# Patient Record
Sex: MALE | Race: OTHER | HISPANIC OR LATINO | URBAN - METROPOLITAN AREA
[De-identification: names, ages, dates, MRNs, and addresses within clinical notes are randomized per-mention and may not be internally consistent; named-entity substitution may affect disease eponyms.]

---

## 2020-03-09 ENCOUNTER — EMERGENCY (EMERGENCY)
Facility: HOSPITAL | Age: 40
LOS: 1 days | Discharge: HOME | End: 2020-03-09
Attending: EMERGENCY MEDICINE | Admitting: EMERGENCY MEDICINE
Payer: SELF-PAY

## 2020-03-09 VITALS
SYSTOLIC BLOOD PRESSURE: 136 MMHG | DIASTOLIC BLOOD PRESSURE: 100 MMHG | HEIGHT: 66 IN | RESPIRATION RATE: 18 BRPM | TEMPERATURE: 98 F | WEIGHT: 160.94 LBS | OXYGEN SATURATION: 99 % | HEART RATE: 90 BPM

## 2020-03-09 VITALS
OXYGEN SATURATION: 95 % | SYSTOLIC BLOOD PRESSURE: 141 MMHG | HEART RATE: 86 BPM | DIASTOLIC BLOOD PRESSURE: 83 MMHG | RESPIRATION RATE: 16 BRPM

## 2020-03-09 DIAGNOSIS — R63.1 POLYDIPSIA: ICD-10-CM

## 2020-03-09 LAB
ANION GAP SERPL CALC-SCNC: 9 MMOL/L — SIGNIFICANT CHANGE UP (ref 5–17)
BASOPHILS # BLD AUTO: 0.03 K/UL — SIGNIFICANT CHANGE UP (ref 0–0.2)
BASOPHILS NFR BLD AUTO: 0.4 % — SIGNIFICANT CHANGE UP (ref 0–2)
BUN SERPL-MCNC: 14 MG/DL — SIGNIFICANT CHANGE UP (ref 7–23)
CALCIUM SERPL-MCNC: 9.5 MG/DL — SIGNIFICANT CHANGE UP (ref 8.4–10.5)
CHLORIDE SERPL-SCNC: 100 MMOL/L — SIGNIFICANT CHANGE UP (ref 96–108)
CO2 SERPL-SCNC: 28 MMOL/L — SIGNIFICANT CHANGE UP (ref 22–31)
CREAT SERPL-MCNC: 0.73 MG/DL — SIGNIFICANT CHANGE UP (ref 0.5–1.3)
EOSINOPHIL # BLD AUTO: 0.25 K/UL — SIGNIFICANT CHANGE UP (ref 0–0.5)
EOSINOPHIL NFR BLD AUTO: 3.6 % — SIGNIFICANT CHANGE UP (ref 0–6)
GLUCOSE SERPL-MCNC: 384 MG/DL — HIGH (ref 70–99)
HCT VFR BLD CALC: 47.2 % — SIGNIFICANT CHANGE UP (ref 39–50)
HGB BLD-MCNC: 16.6 G/DL — SIGNIFICANT CHANGE UP (ref 13–17)
IMM GRANULOCYTES NFR BLD AUTO: 0.7 % — SIGNIFICANT CHANGE UP (ref 0–1.5)
LYMPHOCYTES # BLD AUTO: 2.08 K/UL — SIGNIFICANT CHANGE UP (ref 1–3.3)
LYMPHOCYTES # BLD AUTO: 30.1 % — SIGNIFICANT CHANGE UP (ref 13–44)
MCHC RBC-ENTMCNC: 29.9 PG — SIGNIFICANT CHANGE UP (ref 27–34)
MCHC RBC-ENTMCNC: 35.2 GM/DL — SIGNIFICANT CHANGE UP (ref 32–36)
MCV RBC AUTO: 85 FL — SIGNIFICANT CHANGE UP (ref 80–100)
MONOCYTES # BLD AUTO: 0.52 K/UL — SIGNIFICANT CHANGE UP (ref 0–0.9)
MONOCYTES NFR BLD AUTO: 7.5 % — SIGNIFICANT CHANGE UP (ref 2–14)
NEUTROPHILS # BLD AUTO: 3.99 K/UL — SIGNIFICANT CHANGE UP (ref 1.8–7.4)
NEUTROPHILS NFR BLD AUTO: 57.7 % — SIGNIFICANT CHANGE UP (ref 43–77)
NRBC # BLD: 0 /100 WBCS — SIGNIFICANT CHANGE UP (ref 0–0)
PLATELET # BLD AUTO: 191 K/UL — SIGNIFICANT CHANGE UP (ref 150–400)
POTASSIUM SERPL-MCNC: 4.1 MMOL/L — SIGNIFICANT CHANGE UP (ref 3.5–5.3)
POTASSIUM SERPL-SCNC: 4.1 MMOL/L — SIGNIFICANT CHANGE UP (ref 3.5–5.3)
RBC # BLD: 5.55 M/UL — SIGNIFICANT CHANGE UP (ref 4.2–5.8)
RBC # FLD: 12.1 % — SIGNIFICANT CHANGE UP (ref 10.3–14.5)
SODIUM SERPL-SCNC: 137 MMOL/L — SIGNIFICANT CHANGE UP (ref 135–145)
WBC # BLD: 6.92 K/UL — SIGNIFICANT CHANGE UP (ref 3.8–10.5)
WBC # FLD AUTO: 6.92 K/UL — SIGNIFICANT CHANGE UP (ref 3.8–10.5)

## 2020-03-09 PROCEDURE — 99284 EMERGENCY DEPT VISIT MOD MDM: CPT

## 2020-03-09 PROCEDURE — 36415 COLL VENOUS BLD VENIPUNCTURE: CPT

## 2020-03-09 PROCEDURE — 85027 COMPLETE CBC AUTOMATED: CPT

## 2020-03-09 PROCEDURE — 96360 HYDRATION IV INFUSION INIT: CPT

## 2020-03-09 PROCEDURE — 82962 GLUCOSE BLOOD TEST: CPT

## 2020-03-09 PROCEDURE — 83036 HEMOGLOBIN GLYCOSYLATED A1C: CPT

## 2020-03-09 PROCEDURE — 99283 EMERGENCY DEPT VISIT LOW MDM: CPT | Mod: 25

## 2020-03-09 PROCEDURE — 80048 BASIC METABOLIC PNL TOTAL CA: CPT

## 2020-03-09 RX ORDER — METFORMIN HYDROCHLORIDE 850 MG/1
1 TABLET ORAL
Qty: 20 | Refills: 0
Start: 2020-03-09 | End: 2020-03-18

## 2020-03-09 RX ORDER — SODIUM CHLORIDE 9 MG/ML
1000 INJECTION INTRAMUSCULAR; INTRAVENOUS; SUBCUTANEOUS ONCE
Refills: 0 | Status: COMPLETED | OUTPATIENT
Start: 2020-03-09 | End: 2020-03-09

## 2020-03-09 RX ADMIN — SODIUM CHLORIDE 1000 MILLILITER(S): 9 INJECTION INTRAMUSCULAR; INTRAVENOUS; SUBCUTANEOUS at 12:55

## 2020-03-09 RX ADMIN — SODIUM CHLORIDE 2000 MILLILITER(S): 9 INJECTION INTRAMUSCULAR; INTRAVENOUS; SUBCUTANEOUS at 12:11

## 2020-03-09 NOTE — ED PROVIDER NOTE - CLINICAL SUMMARY MEDICAL DECISION MAKING FREE TEXT BOX
Patient with strong family history of diabetes who developed increased thirst/urinary frequency. Blood sugar elevated .Discussed with family practice who will see patient. Glucophage started

## 2020-03-09 NOTE — ED PROVIDER NOTE - NSFOLLOWUPCLINICS_GEN_ALL_ED_FT
Family Practice [facility]  Family Medicine  [address] [place] [id number]  Phone: [phone]  Fax:   Follow Up Time:

## 2020-03-09 NOTE — ED PROVIDER NOTE - PATIENT PORTAL LINK FT
You can access the FollowMyHealth Patient Portal offered by [facility] by registering at the following website: [email or web address]. By joining [facility]’s FollowMyHealth portal, you will also be able to view your health information using other applications (apps) compatible with our system.

## 2020-03-09 NOTE — ED PROVIDER NOTE - NSFOLLOWUPINSTRUCTIONS_ED_ALL_ED_FT
TYPE 2 DIABETES IN THE OLDER ADULT - AfterCare(R) Instructions(ER/ED)     Type 2 Diabetes in the Older Adult    WHAT YOU NEED TO KNOW:    The risk for type 2 diabetes increases as a person gets older. With type 2 diabetes, your body does not make enough insulin. Also, your body does not use insulin well. Diabetes cannot be cured, but it can be managed.    DISCHARGE INSTRUCTIONS:    Call or have someone close to you call your local emergency number (911 in the [place]) for any of the following:     You have any of the following signs of a stroke:   Numbness or drooping on one side of your face       Weakness in an arm or leg      Confusion or difficulty speaking      Dizziness, a severe headache, or vision loss      You have any of the following signs of a heart attack:   Squeezing, pressure, or pain in your chest      You may also have any of the following:   Discomfort or pain in your back, neck, jaw, stomach, or arm      Shortness of breath      Nausea or vomiting      Lightheadedness or a sudden cold sweat    Return to the emergency department if:     Your blood sugar level is higher than your goal and does not come down with treatment.      You have signs of high blood sugar levels, such as blurred or double vision.       You have signs of high ketone levels, such as fruity, sweet smelling breath, or shallow breathing.       You have symptoms of a low blood sugar level, such as trouble thinking, sweating, or a pounding heartbeat.      Your blood sugar level is lower than normal and it does not improve with treatment.     Call your doctor or diabetes care team if:     You are vomiting or have diarrhea.       You have an upset stomach and cannot eat the foods on your meal plan.      You feel weak or more tired than usual.       You feel dizzy, have headaches, or are easily irritated.       Your skin is red, warm, dry, or swollen.       You have a wound that does not heal.       You have numbness in your arms or legs.       You have trouble coping with your illness, or you feel anxious or depressed.       You have problems with your memory.       You have changes in your vision.       You have questions or concerns about your condition or care.     Manage diabetes and prevent problems: Sometimes type 2 diabetes can be managed with lifestyle changes. These changes may include changes in nutrition and activity.    Work with your diabetes care team to create plans to meet your needs. Your diabetes care team may include a physician, nurse practitioner, and physician assistant. It may also include a diabetes nurse educator, dietitian, and an exercise specialist. Family members, or others who are close to you, may also be part of the team. You and your team will make goals and plans to manage diabetes and other health problems. The plans and goals will be specific to your needs and abilities. Your plan will change as your needs and abilities change.       Manage other health issues as directed. Health issues may include high blood pressure, high cholesterol levels, and heart problems. Health issues may also include depression. Together you and your care team can create a plan to manage any other health issues.       Try to be active for 30 to 60 minutes most days of the week. Activity can help keep your blood sugar level steady and decrease your risk for heart disease. Activity can help improve your balance and strength. It can also decrease your risk for falls. Start slowly. Activity can be done in 10 minute intervals.   Set a goal for aerobic activity for 30 minutes at least 5 times a week. Aerobic activity helps your heart stay strong. Aerobic activity includes walking, bicycling, dancing, swimming, and raking leaves.      Set a strength training goal of 2 times a week. Strength training helps you keep the muscles you have and build new ones. Strength training includes lifting weights, climbing stairs, doing yoga, and yeyo chi.      Stay steady on your on your feet with balancing activities. These include walking backwards, standing on one foot, and walking heel to toe in a straight line.       Maintain a healthy weight. Ask how much you should weigh. A healthy weight can help you control your diabetes and prevent heart disease. Ask your provider to help you create a weight loss plan if you are overweight. Weight loss of 10 to 15 pounds can help make a difference in managing your diabetes. Together you and your care team can set manageable weight loss goals.      Know the risks if you choose to drink alcohol. Alcohol can cause your blood sugar levels to be low if you use insulin. Alcohol can cause high blood sugar levels and weight gain if you drink too much. Women 21 years or older and men 65 years or older should limit alcohol to 1 drink a day. Men aged 21 to 64 years should limit alcohol to 2 drinks a day. A drink of alcohol is 12 ounces of beer, 5 ounces of wine, or 1½ ounces of liquor.       Do not smoke. Ask your healthcare provider for information if you currently smoke and need help to quit. Do not use e-cigarettes or smokeless tobacco in place of cigarettes or to help you quit. They still contain nicotine.     Diabetes education: Diabetes education will start right away. Members of your care team teach you, your family, and caregivers the following:     How to check your blood sugar level: You will learn what your blood sugar level should be. You will be given information on when to check your blood sugar level. You will learn what to do if your level is too high or too low. Write down the times of your checks and your levels. Take them to all follow-up appointments. How to check your blood sugar           About diabetes medicine: You and your family members will be taught how to draw up and give insulin, if needed. You will learn how much insulin you need and what time to inject insulin. You will be taught when not to give insulin. Your team will also teach you how to dispose of needles and syringes. If you need oral diabetes medicine, you will be taught about side effects. You will also be taught when to take or not take the medicine.       About nutrition: A dietitian will help you make a meal plan to keep your blood sugar level steady. You will learn how food affects your blood sugar levels. You will also learn to keep track of sugar and starchy foods (carbohydrates). Do not skip meals. Your blood sugar level may drop too low if you have taken insulin and do not eat.     Other ways to manage your diabetes:     Check your feet every day for sores. Look at your whole foot, including the bottom, and between and under your toes. Check for wounds, corns, and calluses. Use a mirror to see the bottom of your feet. The skin on your feet may be shiny, tight, dry, or darker than normal. Your feet may also be cold and pale. Feel your feet by running your hands along the tops, bottoms, sides, and between your toes. Redness, swelling, and warmth are signs of blood flow problems that can lead to a foot ulcer. Do not try to remove corns or calluses yourself. Diabetic  Foot Care           Wear medical alert identification. Wear medical alert jewelry or carry a card that says you have diabetes. Ask your healthcare provider where to get these items.Medical Alert Jewelry           Ask about vaccines. You have a higher risk for serious illness if you get the flu, pneumonia, or hepatitis. Ask your healthcare provider if you should get a flu, pneumonia, shingles, or hepatitis B vaccine, and when to get the vaccine.       Get help from family and friends. You may need help checking your blood sugar level, giving insulin injections, or preparing your meals. Ask your family and friends to help you with these tasks. Talk to your care team if you need someone at home to help you.     Follow up with your healthcare provider as directed: You will need to return to meet with different care team members. You may need tests to monitor for problems. Write down your questions so you remember to ask them during your visits.        © Copyright [employer] 2020       back to top                      © Copyright [employer] 2020 1.Wednesday  1pm APPT with family [facility]    TYPE 2 DIABETES IN THE OLDER ADULT - AfterCare(R) Instructions(ER/ED)     Type 2 Diabetes in the Older Adult    WHAT YOU NEED TO KNOW:    The risk for type 2 diabetes increases as a person gets older. With type 2 diabetes, your body does not make enough insulin. Also, your body does not use insulin well. Diabetes cannot be cured, but it can be managed.    DISCHARGE INSTRUCTIONS:    Call or have someone close to you call your local emergency number (911 in the [place]) for any of the following:     You have any of the following signs of a stroke:   Numbness or drooping on one side of your face       Weakness in an arm or leg      Confusion or difficulty speaking      Dizziness, a severe headache, or vision loss      You have any of the following signs of a heart attack:   Squeezing, pressure, or pain in your chest      You may also have any of the following:   Discomfort or pain in your back, neck, jaw, stomach, or arm      Shortness of breath      Nausea or vomiting      Lightheadedness or a sudden cold sweat    Return to the emergency department if:     Your blood sugar level is higher than your goal and does not come down with treatment.      You have signs of high blood sugar levels, such as blurred or double vision.       You have signs of high ketone levels, such as fruity, sweet smelling breath, or shallow breathing.       You have symptoms of a low blood sugar level, such as trouble thinking, sweating, or a pounding heartbeat.      Your blood sugar level is lower than normal and it does not improve with treatment.     Call your doctor or diabetes care team if:     You are vomiting or have diarrhea.       You have an upset stomach and cannot eat the foods on your meal plan.      You feel weak or more tired than usual.       You feel dizzy, have headaches, or are easily irritated.       Your skin is red, warm, dry, or swollen.       You have a wound that does not heal.       You have numbness in your arms or legs.       You have trouble coping with your illness, or you feel anxious or depressed.       You have problems with your memory.       You have changes in your vision.       You have questions or concerns about your condition or care.     Manage diabetes and prevent problems: Sometimes type 2 diabetes can be managed with lifestyle changes. These changes may include changes in nutrition and activity.    Work with your diabetes care team to create plans to meet your needs. Your diabetes care team may include a physician, nurse practitioner, and physician assistant. It may also include a diabetes nurse educator, dietitian, and an exercise specialist. Family members, or others who are close to you, may also be part of the team. You and your team will make goals and plans to manage diabetes and other health problems. The plans and goals will be specific to your needs and abilities. Your plan will change as your needs and abilities change.       Manage other health issues as directed. Health issues may include high blood pressure, high cholesterol levels, and heart problems. Health issues may also include depression. Together you and your care team can create a plan to manage any other health issues.       Try to be active for 30 to 60 minutes most days of the week. Activity can help keep your blood sugar level steady and decrease your risk for heart disease. Activity can help improve your balance and strength. It can also decrease your risk for falls. Start slowly. Activity can be done in 10 minute intervals.   Set a goal for aerobic activity for 30 minutes at least 5 times a week. Aerobic activity helps your heart stay strong. Aerobic activity includes walking, bicycling, dancing, swimming, and raking leaves.      Set a strength training goal of 2 times a week. Strength training helps you keep the muscles you have and build new ones. Strength training includes lifting weights, climbing stairs, doing yoga, and yeyo chi.      Stay steady on your on your feet with balancing activities. These include walking backwards, standing on one foot, and walking heel to toe in a straight line.       Maintain a healthy weight. Ask how much you should weigh. A healthy weight can help you control your diabetes and prevent heart disease. Ask your provider to help you create a weight loss plan if you are overweight. Weight loss of 10 to 15 pounds can help make a difference in managing your diabetes. Together you and your care team can set manageable weight loss goals.      Know the risks if you choose to drink alcohol. Alcohol can cause your blood sugar levels to be low if you use insulin. Alcohol can cause high blood sugar levels and weight gain if you drink too much. Women 21 years or older and men 65 years or older should limit alcohol to 1 drink a day. Men aged 21 to 64 years should limit alcohol to 2 drinks a day. A drink of alcohol is 12 ounces of beer, 5 ounces of wine, or 1½ ounces of liquor.       Do not smoke. Ask your healthcare provider for information if you currently smoke and need help to quit. Do not use e-cigarettes or smokeless tobacco in place of cigarettes or to help you quit. They still contain nicotine.     Diabetes education: Diabetes education will start right away. Members of your care team teach you, your family, and caregivers the following:     How to check your blood sugar level: You will learn what your blood sugar level should be. You will be given information on when to check your blood sugar level. You will learn what to do if your level is too high or too low. Write down the times of your checks and your levels. Take them to all follow-up appointments. How to check your blood sugar           About diabetes medicine: You and your family members will be taught how to draw up and give insulin, if needed. You will learn how much insulin you need and what time to inject insulin. You will be taught when not to give insulin. Your team will also teach you how to dispose of needles and syringes. If you need oral diabetes medicine, you will be taught about side effects. You will also be taught when to take or not take the medicine.       About nutrition: A dietitian will help you make a meal plan to keep your blood sugar level steady. You will learn how food affects your blood sugar levels. You will also learn to keep track of sugar and starchy foods (carbohydrates). Do not skip meals. Your blood sugar level may drop too low if you have taken insulin and do not eat.     Other ways to manage your diabetes:     Check your feet every day for sores. Look at your whole foot, including the bottom, and between and under your toes. Check for wounds, corns, and calluses. Use a mirror to see the bottom of your feet. The skin on your feet may be shiny, tight, dry, or darker than normal. Your feet may also be cold and pale. Feel your feet by running your hands along the tops, bottoms, sides, and between your toes. Redness, swelling, and warmth are signs of blood flow problems that can lead to a foot ulcer. Do not try to remove corns or calluses yourself. Diabetic  Foot Care           Wear medical alert identification. Wear medical alert jewelry or carry a card that says you have diabetes. Ask your healthcare provider where to get these items.Medical Alert Jewelry           Ask about vaccines. You have a higher risk for serious illness if you get the flu, pneumonia, or hepatitis. Ask your healthcare provider if you should get a flu, pneumonia, shingles, or hepatitis B vaccine, and when to get the vaccine.       Get help from family and friends. You may need help checking your blood sugar level, giving insulin injections, or preparing your meals. Ask your family and friends to help you with these tasks. Talk to your care team if you need someone at home to help you.     Follow up with your healthcare provider as directed: You will need to return to meet with different care team members. You may need tests to monitor for problems. Write down your questions so you remember to ask them during your visits.        © Copyright [employer] 2020       back to top                      © Copyright [employer] 2020

## 2020-03-11 ENCOUNTER — APPOINTMENT (OUTPATIENT)
Dept: FAMILY MEDICINE | Facility: HOSPITAL | Age: 40
End: 2020-03-11

## 2020-03-11 ENCOUNTER — MED ADMIN CHARGE (OUTPATIENT)
Age: 40
End: 2020-03-11

## 2020-03-11 ENCOUNTER — OUTPATIENT (OUTPATIENT)
Dept: OUTPATIENT SERVICES | Facility: HOSPITAL | Age: 40
LOS: 1 days | End: 2020-03-11
Payer: SELF-PAY

## 2020-03-11 VITALS
RESPIRATION RATE: 16 BRPM | TEMPERATURE: 98.6 F | SYSTOLIC BLOOD PRESSURE: 130 MMHG | WEIGHT: 154 LBS | BODY MASS INDEX: 27.29 KG/M2 | HEART RATE: 94 BPM | OXYGEN SATURATION: 97 % | DIASTOLIC BLOOD PRESSURE: 88 MMHG | HEIGHT: 63 IN

## 2020-03-11 DIAGNOSIS — Z83.3 FAMILY HISTORY OF DIABETES MELLITUS: ICD-10-CM

## 2020-03-11 DIAGNOSIS — Z00.00 ENCOUNTER FOR GENERAL ADULT MEDICAL EXAMINATION WITHOUT ABNORMAL FINDINGS: ICD-10-CM

## 2020-03-11 PROBLEM — Z78.9 OTHER SPECIFIED HEALTH STATUS: Chronic | Status: ACTIVE | Noted: 2020-03-09

## 2020-03-11 PROCEDURE — 80061 LIPID PANEL: CPT

## 2020-03-11 PROCEDURE — G0463: CPT

## 2020-03-11 PROCEDURE — 80048 BASIC METABOLIC PNL TOTAL CA: CPT

## 2020-03-11 PROCEDURE — 87522 HEPATITIS C REVRS TRNSCRPJ: CPT

## 2020-03-11 PROCEDURE — 87389 HIV-1 AG W/HIV-1&-2 AB AG IA: CPT

## 2020-03-11 PROCEDURE — 84443 ASSAY THYROID STIM HORMONE: CPT

## 2020-03-11 PROCEDURE — 84681 ASSAY OF C-PEPTIDE: CPT

## 2020-03-11 NOTE — COUNSELING
[Potential consequences of obesity discussed] : Potential consequences of obesity discussed [Benefits of weight loss discussed] : Benefits of weight loss discussed [Encouraged to maintain food diary] : Encouraged to maintain food diary [Encouraged to increase physical activity] : Encouraged to increase physical activity [Needs reinforcement, provided] : Patient needs reinforcement on understanding of disease, goals and obesity follow-up plan; reinforcement was provided

## 2020-03-12 DIAGNOSIS — E11.9 TYPE 2 DIABETES MELLITUS WITHOUT COMPLICATIONS: ICD-10-CM

## 2020-03-16 LAB
ANION GAP SERPL CALC-SCNC: 19 MMOL/L
BASOPHILS # BLD AUTO: 0.03 K/UL
BASOPHILS NFR BLD AUTO: 0.4 %
BUN SERPL-MCNC: 12 MG/DL
C PEPTIDE SERPL-MCNC: 2.4 NG/ML
CALCIUM SERPL-MCNC: 9.3 MG/DL
CHLORIDE SERPL-SCNC: 99 MMOL/L
CHOLEST SERPL-MCNC: 253 MG/DL
CHOLEST/HDLC SERPL: 6 RATIO
CO2 SERPL-SCNC: 19 MMOL/L
CREAT SERPL-MCNC: 0.57 MG/DL
EOSINOPHIL # BLD AUTO: 0.07 K/UL
EOSINOPHIL NFR BLD AUTO: 0.8 %
GLUCOSE SERPL-MCNC: 242 MG/DL
HCT VFR BLD CALC: 50.5 %
HCV RNA SERPL NAA DL=5-ACNC: NOT DETECTED IU/ML
HCV RNA SERPL NAA+PROBE-LOG IU: NOT DETECTED LOG10IU/ML
HDLC SERPL-MCNC: 42 MG/DL
HGB BLD-MCNC: 16.9 G/DL
HIV1+2 AB SPEC QL IA.RAPID: NONREACTIVE
IMM GRANULOCYTES NFR BLD AUTO: 0.5 %
LDLC SERPL CALC-MCNC: 147 MG/DL
LYMPHOCYTES # BLD AUTO: 1.56 K/UL
LYMPHOCYTES NFR BLD AUTO: 18.8 %
MAN DIFF?: NORMAL
MCHC RBC-ENTMCNC: 29.6 PG
MCHC RBC-ENTMCNC: 33.5 GM/DL
MCV RBC AUTO: 88.6 FL
MONOCYTES # BLD AUTO: 0.45 K/UL
MONOCYTES NFR BLD AUTO: 5.4 %
NEUTROPHILS # BLD AUTO: 6.13 K/UL
NEUTROPHILS NFR BLD AUTO: 74.1 %
PLATELET # BLD AUTO: 228 K/UL
POTASSIUM SERPL-SCNC: 3.8 MMOL/L
RBC # BLD: 5.7 M/UL
RBC # FLD: 12.6 %
SODIUM SERPL-SCNC: 138 MMOL/L
TRIGL SERPL-MCNC: 318 MG/DL
TSH SERPL-ACNC: 1.8 UIU/ML
WBC # FLD AUTO: 8.28 K/UL

## 2020-03-16 NOTE — REVIEW OF SYSTEMS
[Fatigue] : fatigue [Recent Change In Weight] : ~T recent weight change [Depression] : depression [Negative] : Heme/Lymph [Frequency] : frequency [Fever] : no fever [Chills] : no chills [Hot Flashes] : no hot flashes [Night Sweats] : no night sweats [Discharge] : no discharge [Pain] : no pain [Redness] : no redness [Dryness] : no dryness [Vision Problems] : no vision problems [Itching] : no itching [Dysuria] : no dysuria [Incontinence] : no incontinence [Hesitancy] : no hesitancy [Nocturia] : no nocturia [Hematuria] : no hematuria [Impotence] : no impotency [Poor Libido] : libido not poor [Suicidal] : not suicidal [Insomnia] : no insomnia [Anxiety] : no anxiety

## 2020-03-16 NOTE — ASSESSMENT
[FreeTextEntry1] : 40 y/o M with no significant PMH and recently diagnosed with Diabetes Mellitus.\par \par #Health maintenance \par - Flu vaccine and Tdap vaccination given today\par - Pt will need pneumo 23 in one month\par - TSH, lipid profile, CBC, BMP order given\par \par # Diabetes mellitus \par - Recently diagnosed on 3/9/20 \par - Hb1Ac 13 on 3/9/20\par - BG today 239 fasting\par - Foot exam WNL \par - C peptide levels ordered \par - Continue in Glucophage 500 mg PO BID \par - Pt will need a referral to opthalmology, nutritionist and diabetes educator in the next visit.\par - Pt reports feeling sad  about recent diagnosis and with lack of social support. Will need  evaluation in the next visit.\par - Pt counseled about the importance of diet in the management of DM. He was advised to decrease the amount of breads, pastas, sweetener, sodas and food high in carbohydrates and to replace that with vegetables and fruits.\par - He was counseled to exercise frequently.\par - Please came to ED if develop abdominal pain, nausea, vomits, fever, chills, daisha pain and any other symptoms.  \par \par RTC in one week for DM follow up \par \par \par Case discussed with Dr. Terry\par \par \par \par \par

## 2020-03-16 NOTE — PHYSICAL EXAM
[Normal] : normal gait, coordination grossly intact, no focal deficits and deep tendon reflexes were 2+ and symmetric [Alert and Oriented x3] : oriented to person, place, and time [Comprehensive Foot Exam Normal] : Right and left foot were examined and both feet are normal. No ulcers in either foot. Toes are normal and with full ROM.  Normal tactile sensation with monofilament testing throughout both feet [de-identified] : Crying and sad appearance. No suicidal thinking.

## 2020-03-16 NOTE — HISTORY OF PRESENT ILLNESS
[FreeTextEntry1] : Physical exam  [de-identified] : 40 y/o M with no significant PMH came to the clinic for a physical examination. He reports unintentional weight loss, fatigue, excessive thirsty and urinary frequency since one year ago.He went to [place] ED on 3/9/20 for further evaluation of presenting symptoms and was found with elevated blood glucose 384 and Hb1Ac 13. He was prescribed Glucophage 500 mg PO BID.He reports is taking the medication only one time daily. His diet consist mainly of breads, pastas and junk food. He don’t exercise frequently. He reports felling sad about the diagnosis and have loss interest in things that usually enjoyed. He denies thinking of harm himself or another people. He also denies leg cramps/numbness, dysuria, abdominal pain, nausea, vomits, SOB, chest pain and any other symptoms. \par

## 2020-03-16 NOTE — HEALTH RISK ASSESSMENT
[Fair] :  ~his/her~ mood as fair [No] : No [No falls in past year] : Patient reported no falls in the past year [2] : 1) Little interest or pleasure doing things for more than half of the days (2) [3] : 2) Feeling down, depressed, or hopeless for nearly every day (3) [HIV Test offered] : HIV Test offered [Hepatitis C test offered] : Hepatitis C test offered [None] : None [Alone] : lives alone [Employed] : employed [Less Than High School] : less than high school [Significant Other] : lives with significant other [# Of Children ___] : has [unfilled] children [Sexually Active] : sexually active [Fully functional (bathing, dressing, toileting, transferring, walking, feeding)] : Fully functional (bathing, dressing, toileting, transferring, walking, feeding) [Fully functional (using the telephone, shopping, preparing meals, housekeeping, doing laundry, using] : Fully functional and needs no help or supervision to perform IADLs (using the telephone, shopping, preparing meals, housekeeping, doing laundry, using transportation, managing medications and managing finances) [Smoke Detector] : smoke detector [Carbon Monoxide Detector] : carbon monoxide detector [Safety elements used in home] : safety elements used in home [Seat Belt] :  uses seat belt [] : No [[id number]] : 5  [XSV4Xlqdc] : 13 [Change in mental status noted] : No change in mental status noted [Language] : denies difficulty with language [Behavior] : denies difficulty with behavior [Learning/Retaining New Information] : denies difficulty learning/retaining new information [Handling Complex Tasks] : denies difficulty handling complex tasks [Reasoning] : denies difficulty with reasoning [Spatial Ability and Orientation] : denies difficulty with spatial ability and orientation [High Risk Behavior] : no high risk behavior [Reports changes in hearing] : Reports no changes in hearing [Reports changes in vision] : Reports no changes in vision [Reports normal functional visual acuity (ie: able to read med bottle)] : Reports poor functional visual acuity.  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

## 2020-03-18 ENCOUNTER — OUTPATIENT (OUTPATIENT)
Dept: OUTPATIENT SERVICES | Facility: HOSPITAL | Age: 40
LOS: 1 days | End: 2020-03-18
Payer: SELF-PAY

## 2020-03-18 ENCOUNTER — APPOINTMENT (OUTPATIENT)
Dept: FAMILY MEDICINE | Facility: HOSPITAL | Age: 40
End: 2020-03-18

## 2020-03-18 VITALS
HEART RATE: 87 BPM | BODY MASS INDEX: 27.28 KG/M2 | SYSTOLIC BLOOD PRESSURE: 127 MMHG | WEIGHT: 154 LBS | OXYGEN SATURATION: 98 % | TEMPERATURE: 98 F | DIASTOLIC BLOOD PRESSURE: 86 MMHG | RESPIRATION RATE: 16 BRPM

## 2020-03-18 DIAGNOSIS — Z00.00 ENCOUNTER FOR GENERAL ADULT MEDICAL EXAMINATION WITHOUT ABNORMAL FINDINGS: ICD-10-CM

## 2020-03-18 PROCEDURE — G0463: CPT

## 2020-03-18 NOTE — REVIEW OF SYSTEMS
[Vision Problems] : vision problems [Chest Pain] : no chest pain [Palpitations] : no palpitations [Shortness Of Breath] : no shortness of breath [Cough] : no cough [Abdominal Pain] : no abdominal pain [Nausea] : no nausea [Diarrhea] : no diarrhea [Vomiting] : no vomiting [Headache] : no headache [Dizziness] : no dizziness [Suicidal] : not suicidal [Anxiety] : no anxiety [FreeTextEntry3] : bilateral blurry vision

## 2020-03-18 NOTE — PHYSICAL EXAM
[No Acute Distress] : no acute distress [Well-Appearing] : well-appearing [Normal Sclera/Conjunctiva] : normal sclera/conjunctiva [PERRL] : pupils equal round and reactive to light [EOMI] : extraocular movements intact [Normal Outer Ear/Nose] : the outer ears and nose were normal in appearance [Normal Oropharynx] : the oropharynx was normal [No Respiratory Distress] : no respiratory distress  [Clear to Auscultation] : lungs were clear to auscultation bilaterally [Normal Rate] : normal rate  [Regular Rhythm] : with a regular rhythm [Normal S1, S2] : normal S1 and S2 [No Murmur] : no murmur heard [Soft] : abdomen soft [Non Tender] : non-tender [Normal Bowel Sounds] : normal bowel sounds

## 2020-03-18 NOTE — ASSESSMENT
[FreeTextEntry1] : Patient is a 40 y/o with DM2 on glucophage 500 BID and  mood disturbance associated with recent diagnosis of DM2 here for follow up:\par \par \par

## 2020-03-18 NOTE — HISTORY OF PRESENT ILLNESS
[FreeTextEntry1] : Follow up [de-identified] : Patient is 38 y/o male with recent diagnosis of Diabetes Mellitus type 2 started on metformin 500 BID, also with some sadness associated with this recent diagnosis here for 1 week follow up. \par \par Reviewed recommendations from last visit including dietary changes which patient states he has been able to slowly incorporate. States he used to eat larger portions, has now cut down. Avoids sugary drinks. \par \par States that prior to his last visit he felt sad about having a medical condition but felt better after clinic visit. States he had thought about harming self but it was brief and he does not have any present thoughts. Says he is feeling better since starting medication and taking more care of himself. His support system includes his wife, family, and friends.\par \par

## 2020-03-18 NOTE — PLAN
[FreeTextEntry1] : \par \par #Health maintenance \par - Flu vaccine and Tdap vaccination given at last visit\par - Pt will need pneumo 23 in one month\par - All recent labs reviewed with patient\par \par # Diabetes mellitus \par - Recently diagnosed on 3/9/20 \par - Hb1Ac 13 on 3/9/20\par - Able to follow recommendations from last visit, continuing to eat reduced portions, less carbohydrates, more protein\par -  Was on in Glucophage 500 mg PO BID with no side effects of abdominal pain, GI upset, will switch to 1000 mg BID PO given A1c\par - Pt given referral to opthalmology, diabetes educator.\par \par #Mood disturbance\par - Patient feeling sad at last visit related to diagnosis of DM2, feeling better today, states he may have had a moment of hurting himself, spoke with his wife and family, has not had any thoughts since then. \par - PH9 : 4-5\par - Will give referral to SW and follow up\par \par #HLD\par - Lipid panel obtained at last comprehensive visit 3/11\par - Will start patient on statin : Lipitor 20 mg Q nightly and encourage healthy diet, avoidance of fried food, red meat. \par - Will followup at next clinic visit\par \par RTC in follow up 3 months\par \par Case discussed with Dr. Terry\par

## 2020-03-19 DIAGNOSIS — R45.86 EMOTIONAL LABILITY: ICD-10-CM

## 2020-03-19 DIAGNOSIS — E11.9 TYPE 2 DIABETES MELLITUS WITHOUT COMPLICATIONS: ICD-10-CM

## 2020-03-19 DIAGNOSIS — E78.5 HYPERLIPIDEMIA, UNSPECIFIED: ICD-10-CM

## 2020-07-21 ENCOUNTER — APPOINTMENT (OUTPATIENT)
Dept: FAMILY MEDICINE | Facility: HOSPITAL | Age: 40
End: 2020-07-21

## 2020-07-21 ENCOUNTER — OUTPATIENT (OUTPATIENT)
Dept: OUTPATIENT SERVICES | Facility: HOSPITAL | Age: 40
LOS: 1 days | End: 2020-07-21
Payer: SELF-PAY

## 2020-07-21 VITALS
TEMPERATURE: 99.3 F | OXYGEN SATURATION: 95 % | WEIGHT: 151 LBS | BODY MASS INDEX: 26.75 KG/M2 | HEART RATE: 89 BPM | DIASTOLIC BLOOD PRESSURE: 82 MMHG | SYSTOLIC BLOOD PRESSURE: 126 MMHG | RESPIRATION RATE: 16 BRPM

## 2020-07-21 DIAGNOSIS — Z00.00 ENCOUNTER FOR GENERAL ADULT MEDICAL EXAMINATION WITHOUT ABNORMAL FINDINGS: ICD-10-CM

## 2020-07-21 PROCEDURE — G0463: CPT

## 2020-07-22 NOTE — HISTORY OF PRESENT ILLNESS
[FreeTextEntry1] : DM2 F/U [de-identified] : Diabetes Follow up\par Patient is a 38 y/o M with a pmhx significant for HLD, Dm2 A1C13 (3/9/20) who presents today for diabetes follow up. Patient most recent Hbg A1c was 13. Patient’s recent adjustment in their diabetes management include increasing Metformin to 1000mg BID in March. Currently patient denies the following; polyuria, polydipsia, hypoglycemic episodes, weight gain/loss, peripheral extremity tingling/pain, vision changes. Patient states that these symptoms are 100% resolved from 4 months ago. Patient reports good compliance with the medication. Patient states he hasn't recorded his blood glucose because he doesn't have a glucometer at home. Last eye exam was Never . Last Diabetic foot exam was Never.\par \par \par \par \par

## 2020-07-22 NOTE — PHYSICAL EXAM
[No Acute Distress] : no acute distress [Well Nourished] : well nourished [Normal Sclera/Conjunctiva] : normal sclera/conjunctiva [Well Developed] : well developed [PERRL] : pupils equal round and reactive to light [Normal Outer Ear/Nose] : the outer ears and nose were normal in appearance [Normal Oropharynx] : the oropharynx was normal [Normal TMs] : both tympanic membranes were normal [No JVD] : no jugular venous distention [No Lymphadenopathy] : no lymphadenopathy [Clear to Auscultation] : lungs were clear to auscultation bilaterally [No Accessory Muscle Use] : no accessory muscle use [No Respiratory Distress] : no respiratory distress  [Regular Rhythm] : with a regular rhythm [Normal Rate] : normal rate  [Normal S1, S2] : normal S1 and S2 [Non Tender] : non-tender [Soft] : abdomen soft [Normal Anterior Cervical Nodes] : no anterior cervical lymphadenopathy [Normal Supraclavicular Nodes] : no supraclavicular lymphadenopathy [Normal Posterior Cervical Nodes] : no posterior cervical lymphadenopathy [No Joint Swelling] : no joint swelling [Speech Grossly Normal] : speech grossly normal [No Rash] : no rash [Memory Grossly Normal] : memory grossly normal [None] : no ulcers in either foot were found [] : both feet [TWNoteComboBox4] : +2 [TWNoteComboBox3] : +2

## 2020-07-22 NOTE — PLAN
[FreeTextEntry1] : \par \par \par #Diabetes mellitus \par -Well controlled diabetes. 5 HBG A1c in clinic today. Last A1c 13\par -Issues discussed include maintaining a diabetic diet, weight loss, maintaining medication compliance, long term diabetes complications, foot care and podiatry visits, annual eye exam recommendations.\par -No change in current medications\par -Low A1c today possible lab error\par -Ordered Referral to Podiatry/ophthalmology \par -Order Glucometer with supplies\par \par Return in 2 weeks for Testing Blood Glucose education \par \par D/W \par

## 2020-07-22 NOTE — REVIEW OF SYSTEMS
[Fever] : no fever [Chills] : no chills [Fatigue] : no fatigue [Pain] : no pain [Discharge] : no discharge [Earache] : no earache [Hearing Loss] : no hearing loss [Chest Pain] : no chest pain [Palpitations] : no palpitations [Shortness Of Breath] : no shortness of breath [Wheezing] : no wheezing [Abdominal Pain] : no abdominal pain [Nausea] : no nausea [Constipation] : no constipation [Dysuria] : no dysuria [Incontinence] : no incontinence [Frequency] : no frequency [Hematuria] : no hematuria [Joint Pain] : no joint pain [Joint Stiffness] : no joint stiffness [Joint Swelling] : no joint swelling [Back Pain] : no back pain [Headache] : no headache [Itching] : no itching [Dizziness] : no dizziness [Fainting] : no fainting [Memory Loss] : no memory loss

## 2020-07-23 DIAGNOSIS — E11.9 TYPE 2 DIABETES MELLITUS WITHOUT COMPLICATIONS: ICD-10-CM

## 2020-07-23 DIAGNOSIS — E78.5 HYPERLIPIDEMIA, UNSPECIFIED: ICD-10-CM

## 2020-08-04 ENCOUNTER — APPOINTMENT (OUTPATIENT)
Dept: FAMILY MEDICINE | Facility: HOSPITAL | Age: 40
End: 2020-08-04

## 2020-08-09 ENCOUNTER — RESULT CHARGE (OUTPATIENT)
Age: 40
End: 2020-08-09

## 2020-08-10 ENCOUNTER — RESULT CHARGE (OUTPATIENT)
Age: 40
End: 2020-08-10

## 2020-08-10 ENCOUNTER — APPOINTMENT (OUTPATIENT)
Dept: FAMILY MEDICINE | Facility: HOSPITAL | Age: 40
End: 2020-08-10

## 2020-08-10 ENCOUNTER — OUTPATIENT (OUTPATIENT)
Dept: OUTPATIENT SERVICES | Facility: HOSPITAL | Age: 40
LOS: 1 days | End: 2020-08-10
Payer: SELF-PAY

## 2020-08-10 VITALS
BODY MASS INDEX: 26.04 KG/M2 | WEIGHT: 147 LBS | HEART RATE: 85 BPM | SYSTOLIC BLOOD PRESSURE: 118 MMHG | RESPIRATION RATE: 16 BRPM | DIASTOLIC BLOOD PRESSURE: 79 MMHG | TEMPERATURE: 99 F | OXYGEN SATURATION: 96 %

## 2020-08-10 DIAGNOSIS — Z00.00 ENCOUNTER FOR GENERAL ADULT MEDICAL EXAMINATION WITHOUT ABNORMAL FINDINGS: ICD-10-CM

## 2020-08-10 DIAGNOSIS — R45.86 EMOTIONAL LABILITY: ICD-10-CM

## 2020-08-11 DIAGNOSIS — E11.9 TYPE 2 DIABETES MELLITUS WITHOUT COMPLICATIONS: ICD-10-CM

## 2020-08-11 DIAGNOSIS — L98.9 DISORDER OF THE SKIN AND SUBCUTANEOUS TISSUE, UNSPECIFIED: ICD-10-CM

## 2020-08-11 PROCEDURE — 83036 HEMOGLOBIN GLYCOSYLATED A1C: CPT

## 2020-08-11 PROCEDURE — G0463: CPT

## 2020-08-11 NOTE — ASSESSMENT
[FreeTextEntry1] : 38 yo M with hx of HLD and newly diagnosed DM2 (03/2020), last A1C- 5% (7/21/20) presents c/o lesion with itchy sensation on right side of his forehead.

## 2020-08-11 NOTE — PHYSICAL EXAM
[No Acute Distress] : no acute distress [Well Developed] : well developed [No Respiratory Distress] : no respiratory distress  [No Accessory Muscle Use] : no accessory muscle use [Regular Rhythm] : with a regular rhythm [Normal Rate] : normal rate  [Clear to Auscultation] : lungs were clear to auscultation bilaterally [Normal S1, S2] : normal S1 and S2 [Speech Grossly Normal] : speech grossly normal [Normal Mood] : the mood was normal [de-identified] : + hypopigmentation of distal aspect of digits of b/l hands; excoriated appearance/ pink in color, on right lateral forehead, proximal to scalp

## 2020-08-11 NOTE — REVIEW OF SYSTEMS
[Itching] : itching [Fever] : no fever [Shortness Of Breath] : no shortness of breath [Chills] : no chills [Chest Pain] : no chest pain [Abdominal Pain] : no abdominal pain [Nausea] : no nausea [Vomiting] : no vomiting [Nail Changes] : no nail changes [Hair Changes] : no hair changes [Headache] : no headache [Dizziness] : no dizziness [de-identified] : + right forehead proximal to scalp, + skin lesion present , +skin changes

## 2020-08-11 NOTE — HISTORY OF PRESENT ILLNESS
[[employer] ] : provided by [employer]   [FreeTextEntry1] : [id number] [FreeTextEntry8] : 38 yo M with hx of HLD and newly diagnosed DM2 (03/2020), last A1C- 5% (7/21/20) presents c/o lesion with itchy sensation on right lateral aspect of forehead proximal to his hairline. He denies using new lotions and skin products. No recent outdoor activities such as hiking. Pt reports area has not been painful.\par \par He reports he is eating better since being diagnosed with diabetes. He denies any fevers, chills or other acute complaints today. Patient states he is also feeling better, no longer sad/depressed since being diagnosed with diabetes. [TWNoteComboBox1] : Citizen of [place]

## 2020-08-11 NOTE — PLAN
[FreeTextEntry1] : \par \par # Dermatitis of right side of forehead\par -Possibly contact dermatitis vs fungal dermatitis\par -Suspicion for vitiligo based on hypopigmentation of distal phalanx of hands\par -Will prescribe triamcinolone \par \par #DM2\par -A1C today 5.2%\par -Serum A1C ordered as well, given HbA1C 13% in 3/9/2020 and now 5.2%\par -Suspect due to diet change and metformin compliance, patient's A1C has improved remarkably\par -Fingerstick- 106 today in office \par -Continue metformin 1000 mg BID\par -Diabetic educator referral provided\par -Pt encouraged to f/u with optho and podiatry \par -Encouraged patient to check BGMs daily- will need to see diabetic educator for further education\par \par RTC in 2 weeks\par \par If no improvement consider Derm referral\par \par Case and plan d/w Dr. Terry

## 2020-08-17 LAB
ESTIMATED AVERAGE GLUCOSE: 103 MG/DL
GLUCOSE BLDC GLUCOMTR-MCNC: 106
HBA1C MFR BLD HPLC: 5.2 %

## 2020-08-24 ENCOUNTER — APPOINTMENT (OUTPATIENT)
Dept: FAMILY MEDICINE | Facility: HOSPITAL | Age: 40
End: 2020-08-24

## 2020-08-24 ENCOUNTER — OUTPATIENT (OUTPATIENT)
Dept: OUTPATIENT SERVICES | Facility: HOSPITAL | Age: 40
LOS: 1 days | End: 2020-08-24
Payer: SELF-PAY

## 2020-08-24 VITALS
OXYGEN SATURATION: 98 % | RESPIRATION RATE: 16 BRPM | SYSTOLIC BLOOD PRESSURE: 116 MMHG | TEMPERATURE: 98.7 F | HEART RATE: 76 BPM | BODY MASS INDEX: 25.15 KG/M2 | DIASTOLIC BLOOD PRESSURE: 74 MMHG | WEIGHT: 142 LBS

## 2020-08-24 DIAGNOSIS — L81.9 DISORDER OF PIGMENTATION, UNSPECIFIED: ICD-10-CM

## 2020-08-24 DIAGNOSIS — L98.9 DISORDER OF THE SKIN AND SUBCUTANEOUS TISSUE, UNSPECIFIED: ICD-10-CM

## 2020-08-24 DIAGNOSIS — Z00.00 ENCOUNTER FOR GENERAL ADULT MEDICAL EXAMINATION WITHOUT ABNORMAL FINDINGS: ICD-10-CM

## 2020-08-24 PROCEDURE — G0463: CPT

## 2020-08-24 RX ORDER — TRIAMCINOLONE ACETONIDE 5 MG/G
0.5 CREAM TOPICAL 3 TIMES DAILY
Qty: 1 | Refills: 0 | Status: DISCONTINUED | COMMUNITY
Start: 2020-08-10 | End: 2020-08-24

## 2020-08-24 RX ORDER — KETOCONAZOLE FOAM 20 MG/G
2 AEROSOL, FOAM TOPICAL
Qty: 1 | Refills: 0 | Status: DISCONTINUED | COMMUNITY
Start: 2020-08-24 | End: 2020-08-24

## 2020-08-24 NOTE — HISTORY OF PRESENT ILLNESS
[[employer] ] : provided by [employer]   [FreeTextEntry1] : [id number] [TWNoteComboBox1] : [place] [de-identified] : 40 yo male with previously diagnosed DM2 (controlled on metformin, A1C of 5.2 in 8/10/2020), HLD, presenting today for follow up of dermatitis of the R forehead. Pt was prescribed triamcinolone during last clinic visit, however states that the rash has not improved after use of triamcinolone x2 weeks. Pt denies any pain or pruritus associated. In addition, pt also has areas of hypopigmentation of b/l hands (vitiligo diagnosed on last visit), which has not worsened. Pt reports that both rash of the forehead and hypopigmentation of the hands have been present for several months now.

## 2020-08-24 NOTE — PHYSICAL EXAM
[No Acute Distress] : no acute distress [Well-Appearing] : well-appearing [No Respiratory Distress] : no respiratory distress  [Clear to Auscultation] : lungs were clear to auscultation bilaterally [Regular Rhythm] : with a regular rhythm [Normal Rate] : normal rate  [Normal S1, S2] : normal S1 and S2 [No Murmur] : no murmur heard [No Focal Deficits] : no focal deficits [de-identified] : small areas of flat patches, pinkish in color, of the right forehead, with some extension towards the hairline; areas of hypopigmentation involving the medial and lateral surfaces the b/l UE digits.

## 2020-08-25 DIAGNOSIS — L81.9 DISORDER OF PIGMENTATION, UNSPECIFIED: ICD-10-CM

## 2020-08-25 DIAGNOSIS — L30.9 DERMATITIS, UNSPECIFIED: ICD-10-CM

## 2020-09-23 ENCOUNTER — OUTPATIENT (OUTPATIENT)
Dept: OUTPATIENT SERVICES | Facility: HOSPITAL | Age: 40
LOS: 1 days | End: 2020-09-23
Payer: SELF-PAY

## 2020-09-23 ENCOUNTER — APPOINTMENT (OUTPATIENT)
Dept: FAMILY MEDICINE | Facility: HOSPITAL | Age: 40
End: 2020-09-23

## 2020-09-23 VITALS
OXYGEN SATURATION: 98 % | WEIGHT: 142 LBS | DIASTOLIC BLOOD PRESSURE: 80 MMHG | SYSTOLIC BLOOD PRESSURE: 117 MMHG | HEART RATE: 74 BPM | RESPIRATION RATE: 16 BRPM | TEMPERATURE: 97.6 F | BODY MASS INDEX: 25.16 KG/M2 | HEIGHT: 63 IN

## 2020-09-23 DIAGNOSIS — Z00.00 ENCOUNTER FOR GENERAL ADULT MEDICAL EXAMINATION WITHOUT ABNORMAL FINDINGS: ICD-10-CM

## 2020-09-23 PROCEDURE — G0463: CPT

## 2020-09-23 PROCEDURE — G0008: CPT

## 2020-09-23 RX ORDER — KETOCONAZOLE 20 MG/G
2 CREAM TOPICAL TWICE DAILY
Qty: 1 | Refills: 0 | Status: DISCONTINUED | COMMUNITY
Start: 2020-08-24 | End: 2020-09-23

## 2020-09-23 RX ORDER — KETOCONAZOLE 20.5 MG/ML
2 SHAMPOO, SUSPENSION TOPICAL DAILY
Qty: 1 | Refills: 0 | Status: DISCONTINUED | COMMUNITY
Start: 2020-08-24 | End: 2020-09-23

## 2020-09-24 DIAGNOSIS — Z29.9 ENCOUNTER FOR PROPHYLACTIC MEASURES, UNSPECIFIED: ICD-10-CM

## 2020-09-24 DIAGNOSIS — Z23 ENCOUNTER FOR IMMUNIZATION: ICD-10-CM

## 2020-09-24 DIAGNOSIS — L80 VITILIGO: ICD-10-CM

## 2020-09-24 DIAGNOSIS — L30.9 DERMATITIS, UNSPECIFIED: ICD-10-CM

## 2020-09-24 NOTE — ASSESSMENT
[FreeTextEntry1] : 38 y/o M with PMHx of DMII, vitiligo came to the clinic for follow up of rash in the forehead.\par \par #Forehead lesion \par - Pt have two lesions in the R katie measuring approximately 1.5 cm and 1 cm respectively.\par - Lesion appeared on March, 2020 and are increasing on size \par - Pt denies excessive exposure to the sun \par - Initially thought to be due to tinea but have failed treatment with  topical clotrimazole and ketoconazole \par - Pt stopped using ketoconazole due to development of itchiness\par - Patches are pinkish and with white color in the center area \par - Could be  secondary to tinea corporis, tinea versicolor or the beginning of vitiligo in the area of the face \par - We will try clotrimazole/betamethasone cream for 2 weeks \par - I f there is not improvement consider Dermatology evaluation \par \par #Vitiligo \par - Pt with white patches in the fingertips of both hands that started 4 years ago\par - No additional hypopigmented lesions in other parts of the body \par - Continue monitoring \par \par #Preventive measures \par - Flu vaccine administered today \par \par RTC in 2 weeks for f/u Forehead rash \par \par \par Case discussed with Dr. Terry\par \par

## 2020-09-24 NOTE — PHYSICAL EXAM
[Normal] : normal gait, coordination grossly intact, no focal deficits and deep tendon reflexes were 2+ and symmetric [de-identified] : Two patches of pink color with some withe areas measuring 1.5 to 1 cm respectively. Bilateral hand fingertips with white discoloration.

## 2020-09-24 NOTE — HISTORY OF PRESENT ILLNESS
[FreeTextEntry1] : Forehead lesion follow up [de-identified] : 40 y/o M with PMHx of DMII, vitiligo came to the clinic for follow up of rash in the forehead. Pt reports a pink/white lesion in the right upper forehead that started on March, 2020. Pt states lesion have increased in size. At the beginning it was itchy but improved. He was prescribed clotrimazole and ketoconazole in previous appointments w/o improvement of the lesion. He states the last Sunday stopped using ketoconazole because was having itchiness every time applied the cream. He reports itchiness improved after discontinued ketoconazole. He has withe patches in the finger that started 4 years ago suspected to be vitiligo. He denies additional lesion or rash in other part of the body. He denies excessive exposure to sun, fever, chill, nausea, vomits, SOB, chest pain and any other symptoms.

## 2020-09-24 NOTE — REVIEW OF SYSTEMS
[Skin Rash] : skin rash [Negative] : Neurological [Mole Changes] : no mole changes [Nail Changes] : no nail changes [Hair Changes] : no hair changes

## 2020-10-07 ENCOUNTER — APPOINTMENT (OUTPATIENT)
Dept: FAMILY MEDICINE | Facility: HOSPITAL | Age: 40
End: 2020-10-07

## 2020-10-07 ENCOUNTER — OUTPATIENT (OUTPATIENT)
Dept: OUTPATIENT SERVICES | Facility: HOSPITAL | Age: 40
LOS: 1 days | End: 2020-10-07
Payer: SELF-PAY

## 2020-10-07 VITALS
RESPIRATION RATE: 14 BRPM | HEART RATE: 70 BPM | OXYGEN SATURATION: 98 % | TEMPERATURE: 97.5 F | DIASTOLIC BLOOD PRESSURE: 76 MMHG | SYSTOLIC BLOOD PRESSURE: 114 MMHG

## 2020-10-07 DIAGNOSIS — Z00.00 ENCOUNTER FOR GENERAL ADULT MEDICAL EXAMINATION WITHOUT ABNORMAL FINDINGS: ICD-10-CM

## 2020-10-07 PROCEDURE — G0463: CPT

## 2020-10-09 DIAGNOSIS — L30.9 DERMATITIS, UNSPECIFIED: ICD-10-CM

## 2020-10-09 DIAGNOSIS — L80 VITILIGO: ICD-10-CM

## 2020-10-09 DIAGNOSIS — E11.9 TYPE 2 DIABETES MELLITUS WITHOUT COMPLICATIONS: ICD-10-CM

## 2020-10-09 NOTE — ASSESSMENT
[FreeTextEntry1] : 40 y/o M with PMHx of DMII, vitiligo came to the clinic for follow up of rash in the forehead.\par \par #Forehead lesion \par - Pt have two lesions in the R katie measuring approximately 1.5 cm and 1 cm respectively.\par - Lesion appeared on March, 2020 and are increasing on size \par - Pt denies excessive exposure to the sun \par - Initially thought to be due to tinea but have failed treatment with  topical clotrimazole, ketoconazole and betamethasone/clomethiazole\par -Lesions mostly  due to vitiligo in the area of the face \par - Pt informed about presumptive diagnosis and told that  topical cream will not help to disappear the lesions\par -Dermatology referral given  \par \par #Vitiligo \par - Pt with white patches in the fingertips of both hands that started 4 years ago\par - Dermatology referral given \par - Continue monitoring \par \par #DM \par - Last A1c on 8/11/20 was 5.2% \par - Continue on metformin 1000 mg PO BID \par - Foot exam WNL \par - Opthalmology referral given \par - Pt counseled to exercise at least 5 times weekly for 30 minutes \par - Pt counseled to continue a diet low in carbs and high in vegetables and fruits \par \par RTC in 1 month for DM follow up \par \par Case discussed with Dr. Ramirez \par \par \par \par \par \par \par \par

## 2020-10-09 NOTE — END OF VISIT
[FreeTextEntry3] : Saw patient  with Dr Mckeon; pt is on Metformin 1000 bid, HBA1C 5.2, lipitor 20, needs optometry for dilated eye exam vitiligo, facial rash - hypopigmented macule on steroid and antifungal cream- ref to Dermatology for possible punch biopsy and further care; agree with plan of care unless specified.

## 2020-11-11 ENCOUNTER — RESULT CHARGE (OUTPATIENT)
Age: 40
End: 2020-11-11

## 2020-11-11 ENCOUNTER — APPOINTMENT (OUTPATIENT)
Dept: FAMILY MEDICINE | Facility: HOSPITAL | Age: 40
End: 2020-11-11

## 2020-11-11 ENCOUNTER — OUTPATIENT (OUTPATIENT)
Dept: OUTPATIENT SERVICES | Facility: HOSPITAL | Age: 40
LOS: 1 days | End: 2020-11-11
Payer: SELF-PAY

## 2020-11-11 VITALS
TEMPERATURE: 75 F | WEIGHT: 159 LBS | OXYGEN SATURATION: 99 % | RESPIRATION RATE: 14 BRPM | SYSTOLIC BLOOD PRESSURE: 107 MMHG | HEART RATE: 78 BPM | HEIGHT: 63 IN | BODY MASS INDEX: 28.17 KG/M2 | DIASTOLIC BLOOD PRESSURE: 71 MMHG

## 2020-11-11 DIAGNOSIS — Z00.00 ENCOUNTER FOR GENERAL ADULT MEDICAL EXAMINATION WITHOUT ABNORMAL FINDINGS: ICD-10-CM

## 2020-11-11 PROCEDURE — G0463: CPT

## 2020-11-11 NOTE — REVIEW OF SYSTEMS
[Skin Rash] : skin rash [Itching] : no itching [Mole Changes] : no mole changes [Nail Changes] : no nail changes [Hair Changes] : no hair changes [de-identified] : Bilateral hand fingertips with hypopigmentation [Negative] : Heme/Lymph

## 2020-11-11 NOTE — PHYSICAL EXAM
[Comprehensive Foot Exam Normal] : Right and left foot were examined and both feet are normal. No ulcers in either foot. Toes are normal and with full ROM.  Normal tactile sensation with monofilament testing throughout both feet [de-identified] : Two pinkish/white lesions in the R forehead measuring approximately 1.5 cm and 1cm respectively. [Normal] : affect was normal and insight and judgment were intact

## 2020-11-11 NOTE — HISTORY OF PRESENT ILLNESS
[FreeTextEntry1] : f/u Forehead rash  [de-identified] : 40 y/o M with PMHx of DMII, vitiligo came to the clinic for follow up of rash in the forehead. Pt reports a pink/white lesion in the right upper forehead that started on March, 2020. Pt states lesion have increased in size. At the beginning it was itchy but improved. He was prescribed clotrimazole and ketoconazole in previous appointments w/o improvement of the lesion.Pt last time seen on clinic was on 9/23/20  and was started on clomethiazole/ betamethasone topical cream. Pt reports is using the topical cream everyday but have not noticed improvement. He denies additional lesion or rash in other part of the body. He denies excessive exposure to sun, fever, chill, nausea, vomits, SOB, chest pain and any other symptoms. \par \par \par  [Diabetes Mellitus] : Diabetes Mellitus [No episodes] : No hypoglycemic episodes since the last visit. [Does not check] : Patient does not check blood glucose regularly [Understanding of foot care] : Patient expressed understanding of foot care [Most Recent A1C: ___] : Most recent A1C was [unfilled] [Target A1C:  ___] : Target A1C is [unfilled] [Moderate Intensity] : Patient is currently on moderate intensity statin  therapy

## 2020-11-13 DIAGNOSIS — E11.9 TYPE 2 DIABETES MELLITUS WITHOUT COMPLICATIONS: ICD-10-CM

## 2020-11-16 LAB — HBA1C MFR BLD HPLC: 5

## 2021-02-05 ENCOUNTER — OUTPATIENT (OUTPATIENT)
Dept: OUTPATIENT SERVICES | Facility: HOSPITAL | Age: 41
LOS: 1 days | End: 2021-02-05
Payer: SELF-PAY

## 2021-02-05 ENCOUNTER — APPOINTMENT (OUTPATIENT)
Dept: FAMILY MEDICINE | Facility: HOSPITAL | Age: 41
End: 2021-02-05

## 2021-02-05 ENCOUNTER — RESULT CHARGE (OUTPATIENT)
Age: 41
End: 2021-02-05

## 2021-02-05 VITALS
DIASTOLIC BLOOD PRESSURE: 87 MMHG | TEMPERATURE: 98 F | HEIGHT: 63 IN | RESPIRATION RATE: 14 BRPM | BODY MASS INDEX: 27.11 KG/M2 | OXYGEN SATURATION: 100 % | SYSTOLIC BLOOD PRESSURE: 124 MMHG | HEART RATE: 103 BPM | WEIGHT: 153 LBS

## 2021-02-05 DIAGNOSIS — Z00.00 ENCOUNTER FOR GENERAL ADULT MEDICAL EXAMINATION WITHOUT ABNORMAL FINDINGS: ICD-10-CM

## 2021-02-05 LAB — HBA1C MFR BLD HPLC: 5

## 2021-02-05 PROCEDURE — G0463: CPT
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
## 2021-02-06 NOTE — PLAN
[FreeTextEntry1] : 41 yo Male with PMHx of DM and vitiligo came in today to the clinic to follow up on his DM management. \par \par #DM\par -A1c today 5.0%, previous 5.0%\par -Continue metformin 1000mg BID\par -Patient counseled to continue exercising 3-5 times weekly and continue on a low carb diet.\par -Foot exam WNL\par \par #Vitiligo\par - Referral for dermatology given on previous appointment\par \par #HLD\par -Continue Atorvastatin 20mg QD\par \par RTC in 3 months for DM f/u\par \par *Case discussed with Dr. Rodriguez

## 2021-02-06 NOTE — PHYSICAL EXAM
[Normal] : soft, non-tender, non-distended, no masses palpated, no HSM and normal bowel sounds [Comprehensive Foot Exam Normal] : Right and left foot were examined and both feet are normal. No ulcers in either foot. Toes are normal and with full ROM.  Normal tactile sensation with monofilament testing throughout both feet [de-identified] : + vitiligo b/l hands,  +rash on forehead (improving)

## 2021-02-09 DIAGNOSIS — E11.9 TYPE 2 DIABETES MELLITUS WITHOUT COMPLICATIONS: ICD-10-CM

## 2021-02-09 DIAGNOSIS — L30.9 DERMATITIS, UNSPECIFIED: ICD-10-CM

## 2021-02-09 DIAGNOSIS — L80 VITILIGO: ICD-10-CM

## 2021-08-30 ENCOUNTER — RESULT CHARGE (OUTPATIENT)
Age: 41
End: 2021-08-30

## 2021-08-30 ENCOUNTER — APPOINTMENT (OUTPATIENT)
Dept: FAMILY MEDICINE | Facility: HOSPITAL | Age: 41
End: 2021-08-30

## 2021-08-30 ENCOUNTER — OUTPATIENT (OUTPATIENT)
Dept: OUTPATIENT SERVICES | Facility: HOSPITAL | Age: 41
LOS: 1 days | End: 2021-08-30
Payer: SELF-PAY

## 2021-08-30 VITALS
DIASTOLIC BLOOD PRESSURE: 70 MMHG | WEIGHT: 150 LBS | TEMPERATURE: 98.2 F | SYSTOLIC BLOOD PRESSURE: 109 MMHG | RESPIRATION RATE: 14 BRPM | BODY MASS INDEX: 26.57 KG/M2 | HEART RATE: 88 BPM | OXYGEN SATURATION: 97 %

## 2021-08-30 DIAGNOSIS — Z00.00 ENCOUNTER FOR GENERAL ADULT MEDICAL EXAMINATION WITHOUT ABNORMAL FINDINGS: ICD-10-CM

## 2021-08-30 DIAGNOSIS — L30.9 DERMATITIS, UNSPECIFIED: ICD-10-CM

## 2021-08-30 DIAGNOSIS — L80 VITILIGO: ICD-10-CM

## 2021-08-30 PROCEDURE — 82043 UR ALBUMIN QUANTITATIVE: CPT

## 2021-08-30 PROCEDURE — G0463: CPT

## 2021-08-30 PROCEDURE — 85025 COMPLETE CBC W/AUTO DIFF WBC: CPT

## 2021-08-30 PROCEDURE — 80053 COMPREHEN METABOLIC PANEL: CPT

## 2021-08-30 PROCEDURE — 84443 ASSAY THYROID STIM HORMONE: CPT

## 2021-08-30 PROCEDURE — 80061 LIPID PANEL: CPT

## 2021-08-30 RX ORDER — CLOTRIMAZOLE AND BETAMETHASONE DIPROPIONATE 10; .5 MG/G; MG/G
1-0.05 CREAM TOPICAL 3 TIMES DAILY
Qty: 3 | Refills: 0 | Status: DISCONTINUED | COMMUNITY
Start: 2020-09-23 | End: 2021-08-30

## 2021-08-30 NOTE — PHYSICAL EXAM
[Normal] : no rash [Comprehensive Foot Exam Normal] : Right and left foot were examined and both feet are normal. No ulcers in either foot. Toes are normal and with full ROM.  Normal tactile sensation with monofilament testing throughout both feet

## 2021-08-31 LAB
ALBUMIN SERPL ELPH-MCNC: 4.8 G/DL
ALP BLD-CCNC: 65 U/L
ALT SERPL-CCNC: 17 U/L
ANION GAP SERPL CALC-SCNC: 9 MMOL/L
AST SERPL-CCNC: 16 U/L
BASOPHILS # BLD AUTO: 0.03 K/UL
BASOPHILS NFR BLD AUTO: 0.3 %
BILIRUB SERPL-MCNC: 0.3 MG/DL
BUN SERPL-MCNC: 20 MG/DL
CALCIUM SERPL-MCNC: 9.4 MG/DL
CHLORIDE SERPL-SCNC: 105 MMOL/L
CHOLEST SERPL-MCNC: 142 MG/DL
CO2 SERPL-SCNC: 24 MMOL/L
CREAT SERPL-MCNC: 0.78 MG/DL
CREAT SPEC-SCNC: 151 MG/DL
EOSINOPHIL # BLD AUTO: 0.62 K/UL
EOSINOPHIL NFR BLD AUTO: 7 %
GLUCOSE SERPL-MCNC: 109 MG/DL
HCT VFR BLD CALC: 44 %
HDLC SERPL-MCNC: 53 MG/DL
HGB BLD-MCNC: 14.8 G/DL
IMM GRANULOCYTES NFR BLD AUTO: 0.7 %
LDLC SERPL CALC-MCNC: 71 MG/DL
LYMPHOCYTES # BLD AUTO: 2.63 K/UL
LYMPHOCYTES NFR BLD AUTO: 29.7 %
MAN DIFF?: NORMAL
MCHC RBC-ENTMCNC: 30.6 PG
MCHC RBC-ENTMCNC: 33.6 GM/DL
MCV RBC AUTO: 90.9 FL
MICROALBUMIN 24H UR DL<=1MG/L-MCNC: <1.2 MG/DL
MICROALBUMIN/CREAT 24H UR-RTO: NORMAL MG/G
MONOCYTES # BLD AUTO: 0.64 K/UL
MONOCYTES NFR BLD AUTO: 7.2 %
NEUTROPHILS # BLD AUTO: 4.88 K/UL
NEUTROPHILS NFR BLD AUTO: 55.1 %
NONHDLC SERPL-MCNC: 89 MG/DL
PLATELET # BLD AUTO: 187 K/UL
POTASSIUM SERPL-SCNC: 4 MMOL/L
PROT SERPL-MCNC: 7.4 G/DL
RBC # BLD: 4.84 M/UL
RBC # FLD: 13.2 %
SODIUM SERPL-SCNC: 138 MMOL/L
TRIGL SERPL-MCNC: 93 MG/DL
TSH SERPL-ACNC: 5.54 UIU/ML
WBC # FLD AUTO: 8.86 K/UL

## 2021-09-01 NOTE — ASSESSMENT
[FreeTextEntry1] : 41 y/o M with PMHx of DMII, vitiligo came to the clinic for DM II follow up. \par \par #DM \par - A1c today 5.2%, previous 5% on April, 2021 \par - A1c on March, 2021- 13% \par - On Metformin 1000 mg PO BID. Decrease Metformin to 500 mg PO BID \par - Opthalmology referral given \par - Microalbumin/cr ratio taken \par - Foot exam WNL \par - Pt counseled to continue exercising at least 5 times weekly for 30 minutes \par - Pt counseled to continue a diet low in carbs and high in vegetables and fruits \par \par #Vitiligo \par - Pt with white patches in the fingertips of both hands that started 4 years ago\par - Pt also with a lesion on the forehead that started on march, 2020 \par - Dermatology referral given in previous visit but didn't schedule an appointment yet \par \par #Preventive measures\par - Vaccinations up to date \par - Routine labs ordered: TSH, CBC, CMP, lipid profile \par \par RTC in 2 weeks for CPE \par \par Case discussed with Dr. Terry

## 2021-09-01 NOTE — HISTORY OF PRESENT ILLNESS
[FreeTextEntry6] : 41 y/o M with PMHx of DMII, vitiligo came to the clinic for follow up of DM II. Pt last time seen on clinic was on feb, 2021 for DM follow up and A1c at that time 5%.  Pt is taking Metformin 1000 mg PO BID and denies any adverse effect. Pt  reports is walking  at work almost every day. He is eating more vegetables and fruits and less carbohydrates. He don’t check the BG at home. Pt last time saw opthalmology was one year ago.He denies polyphagia, polydipsia, nocturia, nausea, vomits, fever, chills, abdominal pain, leg swelling and any other symptoms. No other complaints reported.

## 2021-09-21 ENCOUNTER — MED ADMIN CHARGE (OUTPATIENT)
Age: 41
End: 2021-09-21

## 2021-09-21 ENCOUNTER — OUTPATIENT (OUTPATIENT)
Dept: OUTPATIENT SERVICES | Facility: HOSPITAL | Age: 41
LOS: 1 days | End: 2021-09-21
Payer: SELF-PAY

## 2021-09-21 ENCOUNTER — APPOINTMENT (OUTPATIENT)
Dept: FAMILY MEDICINE | Facility: HOSPITAL | Age: 41
End: 2021-09-21

## 2021-09-21 VITALS
BODY MASS INDEX: 26.93 KG/M2 | WEIGHT: 152 LBS | HEART RATE: 102 BPM | TEMPERATURE: 97 F | SYSTOLIC BLOOD PRESSURE: 146 MMHG | DIASTOLIC BLOOD PRESSURE: 85 MMHG | OXYGEN SATURATION: 98 % | RESPIRATION RATE: 14 BRPM

## 2021-09-21 DIAGNOSIS — Z00.00 ENCOUNTER FOR GENERAL ADULT MEDICAL EXAMINATION WITHOUT ABNORMAL FINDINGS: ICD-10-CM

## 2021-09-21 PROCEDURE — 84439 ASSAY OF FREE THYROXINE: CPT

## 2021-09-21 PROCEDURE — 84481 FREE ASSAY (FT-3): CPT

## 2021-09-21 PROCEDURE — 86800 THYROGLOBULIN ANTIBODY: CPT

## 2021-09-21 PROCEDURE — G0463: CPT

## 2021-09-22 NOTE — HISTORY OF PRESENT ILLNESS
[FreeTextEntry1] : thyroid check, CPE [de-identified] : 41 y/o male with PMHx of DM2, vitiligo here for follow up elevated TSH level and CPE. Patient was last seen in clinic August 30th for diabetes follow up and was found to have an elevated TSH. Patient has no complaints today. A1c 5.2 was 8/30/21. Patient metformin decreased from 1000 to 500 mg BID last visit. Pt does not check blood glucose at home. Denies headache, chest pain, cold intolerance, fatigue, muscle weakness, leg swelling, fever, chills, chest pain, SOB, N/V, abdominal pain, headache, cough, palpitations, leg pain, dizziness, weakness. \par \par  service provided by [employer]  \par 's ID Number: [id number]\par Language: Armenian \par

## 2021-09-22 NOTE — PHYSICAL EXAM
[No Acute Distress] : no acute distress [Well Nourished] : well nourished [Well Developed] : well developed [Well-Appearing] : well-appearing [Normal Outer Ear/Nose] : the outer ears and nose were normal in appearance [Normal Oropharynx] : the oropharynx was normal [No Respiratory Distress] : no respiratory distress  [No Accessory Muscle Use] : no accessory muscle use [Clear to Auscultation] : lungs were clear to auscultation bilaterally [Normal Rate] : normal rate  [Regular Rhythm] : with a regular rhythm [Normal S1, S2] : normal S1 and S2 [Soft] : abdomen soft [Non Tender] : non-tender [Non-distended] : non-distended [No HSM] : no HSM [Normal Bowel Sounds] : normal bowel sounds [No CVA Tenderness] : no CVA  tenderness [No Spinal Tenderness] : no spinal tenderness [No Joint Swelling] : no joint swelling [Grossly Normal Strength/Tone] : grossly normal strength/tone [Coordination Grossly Intact] : coordination grossly intact [No Focal Deficits] : no focal deficits [Normal Gait] : normal gait [Normal Sclera/Conjunctiva] : normal sclera/conjunctiva [PERRL] : pupils equal round and reactive to light [EOMI] : extraocular movements intact [No JVD] : no jugular venous distention [No Lymphadenopathy] : no lymphadenopathy [Speech Grossly Normal] : speech grossly normal [Memory Grossly Normal] : memory grossly normal [Normal Affect] : the affect was normal [Normal Mood] : the mood was normal [Normal Insight/Judgement] : insight and judgment were intact [None] : no ulcers in either foot were found [] : both feet [de-identified] : +enlarged thyroid gland [de-identified] : +hypopigmented patches b/l hands [TWNoteComboBox3] : +2 [TWNoteComboBox4] : +2

## 2021-09-22 NOTE — HEALTH RISK ASSESSMENT
[Good] : ~his/her~  mood as  good [No] : No [No falls in past year] : Patient reported no falls in the past year [Alone] : lives alone [Employed] : employed [Single] : single [Fully functional (bathing, dressing, toileting, transferring, walking, feeding)] : Fully functional (bathing, dressing, toileting, transferring, walking, feeding) [Fully functional (using the telephone, shopping, preparing meals, housekeeping, doing laundry, using] : Fully functional and needs no help or supervision to perform IADLs (using the telephone, shopping, preparing meals, housekeeping, doing laundry, using transportation, managing medications and managing finances) [Sexually Active] : sexually active [Feels Safe at Home] : Feels safe at home [] : No [High Risk Behavior] : no high risk behavior [Reports changes in hearing] : Reports no changes in hearing [Reports changes in vision] : Reports no changes in vision [Reports changes in dental health] : Reports no changes in dental health [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

## 2021-09-22 NOTE — HISTORY OF PRESENT ILLNESS
[FreeTextEntry1] : thyroid check, CPE [de-identified] : 39 y/o male with PMHx of DM2, vitiligo here for follow up elevated TSH level and CPE. Patient was last seen in clinic August 30th for diabetes follow up and was found to have an elevated TSH. Patient has no complaints today. A1c 5.2 was 8/30/21. Patient metformin decreased from 1000 to 500 mg BID last visit. Pt does not check blood glucose at home. Denies headache, chest pain, cold intolerance, fatigue, muscle weakness, leg swelling, fever, chills, chest pain, SOB, N/V, abdominal pain, headache, cough, palpitations, leg pain, dizziness, weakness. \par \par  service provided by [employer]  \par 's ID Number: [id number]\par Language: Hungarian \par

## 2021-09-22 NOTE — PLAN
[FreeTextEntry1] : \par 39 y/o M with PMHx of DMII, vitiligo came to the clinic for CPE and f/u elevated TSH\par \par #Elevated TSH\par -TSH found to be elevated 5.54 on routine labs last visit  (8/30)\par -no hypothyroid sxs but enlarged thyroid on physical exam\par -f/u free T3, T4, anti-thyroid antibody\par -f/u thyroid ultrasound\par \par #DM \par -A1c today 5.2% 8/30/21\par -Metformin decreased from 1000 to  500 mg PO BID last visit\par -Opthalmology referral given last visit\par -Microalbumin/cr ratio wnl\par -Foot exam WNL \par -Issues discussed include maintaining a diabetic diet, weight loss, continued home glucose monitoring, maintaining medication compliance, long term diabetes complications, foot care and podiatry visits, annual eye exam recommendations. \par \par #Vitiligo \par -Dermatology referral given in previous visit\par \par #HCM\par -bloodwork reviewed \par -flu shot received today \par \par RTC 3 months for DM f/u\par \par Case discussed with Dr. Terry

## 2021-09-22 NOTE — PHYSICAL EXAM
[No Acute Distress] : no acute distress [Well Nourished] : well nourished [Well Developed] : well developed [Well-Appearing] : well-appearing [Normal Outer Ear/Nose] : the outer ears and nose were normal in appearance [Normal Oropharynx] : the oropharynx was normal [No Respiratory Distress] : no respiratory distress  [No Accessory Muscle Use] : no accessory muscle use [Clear to Auscultation] : lungs were clear to auscultation bilaterally [Normal Rate] : normal rate  [Regular Rhythm] : with a regular rhythm [Normal S1, S2] : normal S1 and S2 [Soft] : abdomen soft [Non Tender] : non-tender [Non-distended] : non-distended [No HSM] : no HSM [Normal Bowel Sounds] : normal bowel sounds [No CVA Tenderness] : no CVA  tenderness [No Spinal Tenderness] : no spinal tenderness [No Joint Swelling] : no joint swelling [Grossly Normal Strength/Tone] : grossly normal strength/tone [Coordination Grossly Intact] : coordination grossly intact [No Focal Deficits] : no focal deficits [Normal Gait] : normal gait [Normal Sclera/Conjunctiva] : normal sclera/conjunctiva [PERRL] : pupils equal round and reactive to light [EOMI] : extraocular movements intact [No JVD] : no jugular venous distention [No Lymphadenopathy] : no lymphadenopathy [Speech Grossly Normal] : speech grossly normal [Memory Grossly Normal] : memory grossly normal [Normal Affect] : the affect was normal [Normal Mood] : the mood was normal [Normal Insight/Judgement] : insight and judgment were intact [None] : no ulcers in either foot were found [] : both feet [de-identified] : +enlarged thyroid gland [de-identified] : +hypopigmented patches b/l hands [TWNoteComboBox3] : +2 [TWNoteComboBox4] : +2

## 2021-09-23 DIAGNOSIS — E04.9 NONTOXIC GOITER, UNSPECIFIED: ICD-10-CM

## 2021-09-23 DIAGNOSIS — Z23 ENCOUNTER FOR IMMUNIZATION: ICD-10-CM

## 2021-09-23 DIAGNOSIS — E11.9 TYPE 2 DIABETES MELLITUS WITHOUT COMPLICATIONS: ICD-10-CM

## 2021-09-27 ENCOUNTER — OUTPATIENT (OUTPATIENT)
Dept: OUTPATIENT SERVICES | Facility: HOSPITAL | Age: 41
LOS: 1 days | End: 2021-09-27
Payer: SELF-PAY

## 2021-09-27 ENCOUNTER — RESULT REVIEW (OUTPATIENT)
Age: 41
End: 2021-09-27

## 2021-09-27 DIAGNOSIS — R79.89 OTHER SPECIFIED ABNORMAL FINDINGS OF BLOOD CHEMISTRY: ICD-10-CM

## 2021-09-27 DIAGNOSIS — E04.9 NONTOXIC GOITER, UNSPECIFIED: ICD-10-CM

## 2021-09-27 PROCEDURE — 76536 US EXAM OF HEAD AND NECK: CPT | Mod: 26

## 2021-09-27 PROCEDURE — 76536 US EXAM OF HEAD AND NECK: CPT

## 2021-09-28 ENCOUNTER — OUTPATIENT (OUTPATIENT)
Dept: OUTPATIENT SERVICES | Facility: HOSPITAL | Age: 41
LOS: 1 days | End: 2021-09-28

## 2021-09-28 DIAGNOSIS — K75.0 ABSCESS OF LIVER: ICD-10-CM

## 2021-09-28 DIAGNOSIS — Z00.00 ENCOUNTER FOR GENERAL ADULT MEDICAL EXAMINATION WITHOUT ABNORMAL FINDINGS: ICD-10-CM

## 2021-09-29 LAB
T3FREE SERPL-MCNC: 3.7 PG/ML
T4 FREE SERPL-MCNC: 1.4 NG/DL
THYROGLOB AB SERPL-ACNC: <20 IU/ML
THYROPEROXIDASE AB SERPL IA-ACNC: 12.1 IU/ML

## 2021-10-03 ENCOUNTER — NON-APPOINTMENT (OUTPATIENT)
Age: 41
End: 2021-10-03

## 2021-10-25 NOTE — ASSESSMENT
[FreeTextEntry1] : 40 y/o M with PMHx of DMII, vitiligo came to the clinic for DM II follow up. \par \par #DM \par - A1c today 11/11/20- 5.0%, previous  5.2% \par - Continue on metformin 1000 mg PO BID \par - Foot exam WNL \par - Opthalmology referral given in previous appt but pt have not do the appt yet \par - Pt counseled to continue exercising at least 5 times weekly for 30 minutes \par - Pt counseled to continue a diet low in carbs and high in vegetables and fruits \par \par #Vitiligo \par - Pt with white patches in the fingertips of both hands that started 4 years ago\par - Pt also with a lesion on the forehead that started on march, 2020 \par - Dermatology referral given \par \par #Preventive measures\par - Vaccinations up to date \par \par \par RTC in 3 months for DM follow up \par \par \par Case discussed with Dr. Terry\par \par \par \par \par \par \par \par \par \par \par \par \par \par \par \par \par \par \par \par \par \par \par \par \par \par \par \par \par \par \par \par \par \par

## 2021-10-25 NOTE — HISTORY OF PRESENT ILLNESS
[Diabetes Mellitus] : Diabetes Mellitus [No episodes] : No hypoglycemic episodes since the last visit. [Does not check] : Patient does not check blood glucose regularly [Understanding of foot care] : Patient expressed understanding of foot care [Most Recent A1C: ___] : Most recent A1C was [unfilled] [Target A1C:  ___] : Target A1C is [unfilled] [Target goal met] : A1C target goal met [Moderate Intensity] : Patient is currently on moderate intensity statin  therapy [FreeTextEntry6] : 38 y/o M with PMHx of DMII, vitiligo came to the clinic for follow up of DM II. Pt last time seen on clinic was on 10/7/20 for follow up of rash in the forehead mostly secondary to vitiligo and was referred to dermatology. Pt is taking metformin 1000 mg PO BID and denies any adverse effect. Pt  reports is walking almost every day. He is eating more vegetable and fruits and less carbohydrates. He denies polyphagia, polydipsia, nocturia, nausea, vomits, fever, chills, abdominal pain, leg swelling and any other symptoms. No other complaints reported.

## 2021-10-25 NOTE — HISTORY OF PRESENT ILLNESS
[Diabetes Mellitus] : Diabetes Mellitus [No episodes] : No hypoglycemic episodes since the last visit. [Does not check] : Patient does not check blood glucose regularly [Understanding of foot care] : Patient expressed understanding of foot care [Most Recent A1C: ___] : Most recent A1C was [unfilled] [Target A1C:  ___] : Target A1C is [unfilled] [Target goal met] : A1C target goal met [Moderate Intensity] : Patient is currently on moderate intensity statin  therapy [FreeTextEntry6] : 40 y/o M with PMHx of DMII, vitiligo came to the clinic for follow up of DM II. Pt last time seen on clinic was on 10/7/20 for follow up of rash in the forehead mostly secondary to vitiligo and was referred to dermatology. Pt is taking metformin 1000 mg PO BID and denies any adverse effect. Pt  reports is walking almost every day. He is eating more vegetable and fruits and less carbohydrates. He denies polyphagia, polydipsia, nocturia, nausea, vomits, fever, chills, abdominal pain, leg swelling and any other symptoms. No other complaints reported.

## 2022-01-22 ENCOUNTER — RESULT CHARGE (OUTPATIENT)
Age: 42
End: 2022-01-22

## 2022-01-22 ENCOUNTER — APPOINTMENT (OUTPATIENT)
Dept: FAMILY MEDICINE | Facility: HOSPITAL | Age: 42
End: 2022-01-22

## 2022-01-22 ENCOUNTER — OUTPATIENT (OUTPATIENT)
Dept: OUTPATIENT SERVICES | Facility: HOSPITAL | Age: 42
LOS: 1 days | End: 2022-01-22
Payer: SELF-PAY

## 2022-01-22 DIAGNOSIS — Z00.00 ENCOUNTER FOR GENERAL ADULT MEDICAL EXAMINATION WITHOUT ABNORMAL FINDINGS: ICD-10-CM

## 2022-01-22 PROBLEM — L80 VITILIGO: Status: ACTIVE | Noted: 2020-08-10

## 2022-01-22 PROBLEM — L30.9 DERMATITIS OF FACE: Status: RESOLVED | Noted: 2020-08-10 | Resolved: 2022-01-22

## 2022-01-22 PROCEDURE — G0463: CPT

## 2022-01-24 DIAGNOSIS — E11.9 TYPE 2 DIABETES MELLITUS WITHOUT COMPLICATIONS: ICD-10-CM

## 2022-01-24 DIAGNOSIS — L80 VITILIGO: ICD-10-CM

## 2022-02-03 ENCOUNTER — OUTPATIENT (OUTPATIENT)
Dept: OUTPATIENT SERVICES | Facility: HOSPITAL | Age: 42
LOS: 1 days | End: 2022-02-03
Payer: SELF-PAY

## 2022-02-03 ENCOUNTER — APPOINTMENT (OUTPATIENT)
Dept: FAMILY MEDICINE | Facility: HOSPITAL | Age: 42
End: 2022-02-03

## 2022-02-03 VITALS
OXYGEN SATURATION: 99 % | HEART RATE: 73 BPM | DIASTOLIC BLOOD PRESSURE: 69 MMHG | SYSTOLIC BLOOD PRESSURE: 116 MMHG | RESPIRATION RATE: 14 BRPM | TEMPERATURE: 97.1 F

## 2022-02-03 DIAGNOSIS — Z00.00 ENCOUNTER FOR GENERAL ADULT MEDICAL EXAMINATION WITHOUT ABNORMAL FINDINGS: ICD-10-CM

## 2022-02-03 DIAGNOSIS — Z92.29 PERSONAL HISTORY OF OTHER DRUG THERAPY: ICD-10-CM

## 2022-02-03 DIAGNOSIS — Z23 ENCOUNTER FOR IMMUNIZATION: ICD-10-CM

## 2022-02-03 LAB — HBA1C MFR BLD HPLC: 5.5

## 2022-02-03 PROCEDURE — G0463: CPT

## 2022-02-05 DIAGNOSIS — Z23 ENCOUNTER FOR IMMUNIZATION: ICD-10-CM

## 2022-02-08 NOTE — PLAN
[FreeTextEntry1] : #Immunization\par COVID-19 Booster deferred at this time\par Explained to patient that the moderna booster has better coverage for OMICRON variant.\par Also explained that since patient had Pfizer in the past, he can still receive the moderna vaccine today\par Patient decided to defer the vaccine today, and says he will call the clinic to make another appointment\par \par \par Discussed with Dr. Terry

## 2022-02-08 NOTE — HISTORY OF PRESENT ILLNESS
[FreeTextEntry8] : 42 y/o M here for COVID-19 Vaccine booster. Patient denies any recent hx of cough, rhinorrhea, nasal congestion, fever. Patient denies any sick contacts at home. Patient denies any allergic reactions to vaccines in the past.

## 2022-02-08 NOTE — REVIEW OF SYSTEMS
[Fever] : no fever [Discharge] : no discharge [Earache] : no earache [Chest Pain] : no chest pain [Palpitations] : no palpitations [Shortness Of Breath] : no shortness of breath [Wheezing] : no wheezing [Cough] : no cough [Dyspnea on Exertion] : not dyspnea on exertion [Abdominal Pain] : no abdominal pain [Nausea] : no nausea [Constipation] : no constipation [Vomiting] : no vomiting [Dysuria] : no dysuria [Joint Pain] : no joint pain [Joint Stiffness] : no joint stiffness [Anxiety] : no anxiety [Depression] : no depression

## 2022-02-19 ENCOUNTER — APPOINTMENT (OUTPATIENT)
Dept: FAMILY MEDICINE | Facility: HOSPITAL | Age: 42
End: 2022-02-19

## 2022-02-19 ENCOUNTER — OUTPATIENT (OUTPATIENT)
Dept: OUTPATIENT SERVICES | Facility: HOSPITAL | Age: 42
LOS: 1 days | End: 2022-02-19
Payer: SELF-PAY

## 2022-02-19 VITALS
HEART RATE: 88 BPM | SYSTOLIC BLOOD PRESSURE: 135 MMHG | HEIGHT: 63 IN | WEIGHT: 163 LBS | RESPIRATION RATE: 14 BRPM | BODY MASS INDEX: 28.88 KG/M2 | DIASTOLIC BLOOD PRESSURE: 77 MMHG | TEMPERATURE: 98.4 F | OXYGEN SATURATION: 98 %

## 2022-02-19 DIAGNOSIS — Z00.00 ENCOUNTER FOR GENERAL ADULT MEDICAL EXAMINATION WITHOUT ABNORMAL FINDINGS: ICD-10-CM

## 2022-02-19 DIAGNOSIS — E04.9 NONTOXIC GOITER, UNSPECIFIED: ICD-10-CM

## 2022-02-19 PROCEDURE — G0463: CPT

## 2022-02-19 PROCEDURE — 84443 ASSAY THYROID STIM HORMONE: CPT

## 2022-02-23 LAB — TSH SERPL-ACNC: 2.77 UIU/ML

## 2022-02-25 DIAGNOSIS — R79.89 OTHER SPECIFIED ABNORMAL FINDINGS OF BLOOD CHEMISTRY: ICD-10-CM

## 2022-02-25 DIAGNOSIS — E11.9 TYPE 2 DIABETES MELLITUS WITHOUT COMPLICATIONS: ICD-10-CM

## 2022-02-25 DIAGNOSIS — E04.9 NONTOXIC GOITER, UNSPECIFIED: ICD-10-CM

## 2022-02-26 NOTE — HISTORY OF PRESENT ILLNESS
[FreeTextEntry1] : DM follow up  [de-identified] : 42 y/o M with PMHx of vitiligo and DMII came to the clinic for follow up of DM. Pt last time seen on clinic was on 9/21/21 for follow up of elevated TSH and found with enlarge thyroid and US thyroid performed. US thyroid showed mild enlargement of the thyroid. He denies any thyroid symptoms. Pt reports is taking metformin 500 mg PO daily. He is not taking the BG at home. Pt is not eating sweet food or food high in carbs.Pt is walking 20-30 min three times weekly. Pt  reports last time had an eye check was one year ago. Pt reports continues with vitiligo in the hands and a new lesion in the chest area. He was referred to dermatology before but never make an appointment.He denies polyphagia, polydipsia, nocturia, nausea, vomits, fever, chills, abdominal pain, leg swelling and any other symptoms. No other complaints reported.

## 2022-02-26 NOTE — REVIEW OF SYSTEMS
[Negative] : Gastrointestinal [Itching] : no itching [Mole Changes] : no mole changes [Nail Changes] : no nail changes [Skin Rash] : no skin rash [de-identified] : worsening vitiligo in the hand and new spot in the chest area

## 2022-02-26 NOTE — PLAN
[FreeTextEntry1] : 40 y/o M with PMHx of vitiligo and DMII came to the clinic for follow up of DM type II.\par \par #DM type II \par - A1c today 5.5%, previous 5.2% on Aug, 2021\par - A1c on March, 2021- 13% \par - On Metformin 500 mg PO BID. Decrease Metformin to 500 mg PO daily \par - Appointment for fundus exam given\par - Microalbumin/cr ratio on Aug, 2021 normal \par - Foot exam WNL \par - Pt counseled to continue exercising at least 5 times weekly for 30 minutes \par - Pt counseled to continue a diet low in carbs and high in vegetables and fruits \par \par #Vitiligo \par - Pt with white patches in the fingertips of both hands that started 4 years ago\par - Pt also with a lesion on the forehead that started on march, 2020 \par - Pt reports a new lesion in the chest area and mild worsening of the previous ones \par - Dermatology referral given in previous visit but didn't schedule an appointment yet \par - Dermatology referral given again \par \par #Enlarge thyroid\par - TSH on Aug, 2021 - 5.54 \par - US thyroid showed borderline enlarge thyroid \par - Thyroid Ab, T3, T4 WNL \par - Recheck thyroid in the next appointment\par  \par #Preventive measures\par - Vaccinations up to date \par - Pt interested in COVID 19 booster vaccine. Appt given  \par \par RTC in 2 weeks for  COVID 19 booster vaccine \par \par Case discussed with Dr. Rodriguez \par

## 2022-02-26 NOTE — PHYSICAL EXAM
[Normal] : soft, non-tender, non-distended, no masses palpated, no HSM and normal bowel sounds [Comprehensive Foot Exam Normal] : Right and left foot were examined and both feet are normal. No ulcers in either foot. Toes are normal and with full ROM.  Normal tactile sensation with monofilament testing throughout both feet [de-identified] : hypopigmented patch in the hands

## 2022-02-27 NOTE — HISTORY OF PRESENT ILLNESS
[FreeTextEntry1] : DM follow up  [de-identified] : 42 y/o M with PMHx of vitiligo and DMII came to the clinic for follow up of DM. Pt last time seen on clinic was on 3/2/22 for Moderna COVID 19 vaccine booster. Pt reports is taking metformin 500 mg PO daily. He is not taking the BG at home. Pt is not eating sweet food or food high in carbs.Pt is walking 20-30 min three times weekly. Pt  reports last time had an eye check was one year ago. Pt reports continues with vitiligo in the hands and a new lesion in the chest area. He was referred to dermatology before but never make an appointment.He denies polyphagia, polydipsia, nocturia, nausea, vomits, fever, chills, abdominal pain, leg swelling and any other symptoms. No other complaints reported.

## 2022-03-16 ENCOUNTER — OUTPATIENT (OUTPATIENT)
Dept: OUTPATIENT SERVICES | Facility: HOSPITAL | Age: 42
LOS: 1 days | End: 2022-03-16
Payer: SELF-PAY

## 2022-03-16 ENCOUNTER — APPOINTMENT (OUTPATIENT)
Dept: FAMILY MEDICINE | Facility: HOSPITAL | Age: 42
End: 2022-03-16

## 2022-03-16 ENCOUNTER — NON-APPOINTMENT (OUTPATIENT)
Age: 42
End: 2022-03-16

## 2022-03-16 VITALS
TEMPERATURE: 97.3 F | RESPIRATION RATE: 17 BRPM | OXYGEN SATURATION: 99 % | SYSTOLIC BLOOD PRESSURE: 106 MMHG | HEART RATE: 71 BPM | BODY MASS INDEX: 28.7 KG/M2 | WEIGHT: 162 LBS | DIASTOLIC BLOOD PRESSURE: 67 MMHG

## 2022-03-16 DIAGNOSIS — Z00.00 ENCOUNTER FOR GENERAL ADULT MEDICAL EXAMINATION WITHOUT ABNORMAL FINDINGS: ICD-10-CM

## 2022-03-16 PROCEDURE — G0463: CPT

## 2022-03-16 NOTE — ASSESSMENT
[FreeTextEntry1] : *Above discussed w Dr. Perez\par \par \par \par ~ f/u in 1 month for DM management and A1C

## 2022-03-16 NOTE — HISTORY OF PRESENT ILLNESS
[Patient Declined  Services] : - None: Patient declined  services [FreeTextEntry1] : diabetic eye exam  [FreeTextEntry3] : Visit continued in shared language\par  [TWNoteComboBox1] : [place]ian [de-identified] : Pt is a 42 yo (M) w PMH of Type 2 DM, HLD, presenting for funduscopic exam. Pt denies any acute complaints. Denies any changes in vision since last being seen. Pt's last HbA1C was 5.5% on 1/22/2022. Pt's last CMP/Urine Creatine demonstrates normal renal function on 8/2021. Pt's diabetes is currently managed on metformin. \par \par

## 2022-03-17 DIAGNOSIS — Z01.00 ENCOUNTER FOR EXAMINATION OF EYES AND VISION WITHOUT ABNORMAL FINDINGS: ICD-10-CM

## 2022-03-17 DIAGNOSIS — E11.9 TYPE 2 DIABETES MELLITUS WITHOUT COMPLICATIONS: ICD-10-CM

## 2022-03-18 ENCOUNTER — NON-APPOINTMENT (OUTPATIENT)
Age: 42
End: 2022-03-18

## 2022-04-19 ENCOUNTER — RESULT CHARGE (OUTPATIENT)
Age: 42
End: 2022-04-19

## 2022-04-19 ENCOUNTER — OUTPATIENT (OUTPATIENT)
Dept: OUTPATIENT SERVICES | Facility: HOSPITAL | Age: 42
LOS: 1 days | End: 2022-04-19
Payer: SELF-PAY

## 2022-04-19 ENCOUNTER — APPOINTMENT (OUTPATIENT)
Dept: FAMILY MEDICINE | Facility: HOSPITAL | Age: 42
End: 2022-04-19

## 2022-04-19 VITALS
TEMPERATURE: 97 F | DIASTOLIC BLOOD PRESSURE: 76 MMHG | WEIGHT: 162 LBS | OXYGEN SATURATION: 99 % | RESPIRATION RATE: 18 BRPM | HEART RATE: 74 BPM | BODY MASS INDEX: 28.7 KG/M2 | SYSTOLIC BLOOD PRESSURE: 126 MMHG

## 2022-04-19 DIAGNOSIS — Z00.00 ENCOUNTER FOR GENERAL ADULT MEDICAL EXAMINATION WITHOUT ABNORMAL FINDINGS: ICD-10-CM

## 2022-04-19 PROCEDURE — G0463: CPT

## 2022-04-20 NOTE — PLAN
[FreeTextEntry1] : 40 yo male with PMHx of vitiligo and T2DM presents to the clinic for diabetes follow up.  He has been taking all his medications as prescribed.\par \par #T2DM\par - A1c today 5.6%\par - Continue Metformin qd\par - Discussed the importance of low carb/sugar diet\par - Encouraged exercise 3-5x week for at least 30 min\par - RTC 6 months for follow up\par \par #HLD\par - Continue Atovastatin, prescription sent to pharmacy\par - RTC 6 months for follow up\par \par *Case discussed with Dr. Michaels

## 2022-04-20 NOTE — HISTORY OF PRESENT ILLNESS
[Diabetes Mellitus] : Diabetes Mellitus [Hyperlipidemia] : Hyperlipidemia [Patient was last seen on ___] : Patient was last seen on [unfilled] [No episodes] : No hypoglycemic episodes since the last visit. [Does not check] : Patient does not check blood glucose regularly [Understanding of foot care] : Patient expressed understanding of foot care [Most Recent A1C: ___] : Most recent A1C was [unfilled] [Target goal met] : A1C target goal met [Stable] : Patient is stable [Low Intensity Therapy] : Patient is currently on low intensity statin  therapy [FreeTextEntry6] : 40 yo male with PMHx of vitiligo and T2DM presents to the clinic for diabetes follow up.  He has been taking all his medications as prescribed.\par He denies polyphagia, polydipsia, nocturia, nausea, vomits, fever, chills, abdominal pain, leg swelling and any other symptoms. No other complaints reported.

## 2022-04-21 DIAGNOSIS — E78.5 HYPERLIPIDEMIA, UNSPECIFIED: ICD-10-CM

## 2022-04-21 DIAGNOSIS — E11.9 TYPE 2 DIABETES MELLITUS WITHOUT COMPLICATIONS: ICD-10-CM

## 2022-04-21 LAB — HBA1C MFR BLD HPLC: 5.6

## 2022-10-15 ENCOUNTER — OUTPATIENT (OUTPATIENT)
Dept: OUTPATIENT SERVICES | Facility: HOSPITAL | Age: 42
LOS: 1 days | End: 2022-10-15
Payer: SELF-PAY

## 2022-10-15 ENCOUNTER — APPOINTMENT (OUTPATIENT)
Dept: FAMILY MEDICINE | Facility: HOSPITAL | Age: 42
End: 2022-10-15

## 2022-10-15 ENCOUNTER — MED ADMIN CHARGE (OUTPATIENT)
Age: 42
End: 2022-10-15

## 2022-10-15 ENCOUNTER — RESULT CHARGE (OUTPATIENT)
Age: 42
End: 2022-10-15

## 2022-10-15 VITALS
TEMPERATURE: 98.4 F | WEIGHT: 156 LBS | RESPIRATION RATE: 18 BRPM | DIASTOLIC BLOOD PRESSURE: 76 MMHG | BODY MASS INDEX: 27.63 KG/M2 | HEART RATE: 79 BPM | OXYGEN SATURATION: 99 % | SYSTOLIC BLOOD PRESSURE: 112 MMHG

## 2022-10-15 DIAGNOSIS — Z00.00 ENCOUNTER FOR GENERAL ADULT MEDICAL EXAMINATION WITHOUT ABNORMAL FINDINGS: ICD-10-CM

## 2022-10-15 DIAGNOSIS — R79.89 OTHER SPECIFIED ABNORMAL FINDINGS OF BLOOD CHEMISTRY: ICD-10-CM

## 2022-10-15 PROCEDURE — G0008: CPT

## 2022-10-15 PROCEDURE — G0463: CPT

## 2022-10-15 PROCEDURE — 82043 UR ALBUMIN QUANTITATIVE: CPT

## 2022-10-18 LAB
CREAT SPEC-SCNC: 193 MG/DL
HBA1C MFR BLD HPLC: 5.2
MICROALBUMIN 24H UR DL<=1MG/L-MCNC: <1.2 MG/DL
MICROALBUMIN/CREAT 24H UR-RTO: NORMAL MG/G

## 2022-10-19 DIAGNOSIS — E11.9 TYPE 2 DIABETES MELLITUS WITHOUT COMPLICATIONS: ICD-10-CM

## 2022-10-19 NOTE — HISTORY OF PRESENT ILLNESS
[Diabetes Mellitus] : Diabetes Mellitus [Patient was last seen on ___] : Patient was last seen on [unfilled] [No episodes] : No hypoglycemic episodes since the last visit. [Does not check] : Patient does not check blood glucose regularly [Most Recent A1C: ___] : Most recent A1C was [unfilled] [Target goal met] : A1C target goal met [Moderate Intensity] : Patient is currently on moderate intensity statin  therapy [FreeTextEntry6] : 41M with PMHx DM presents for DM f/u. pt taking meds as prescribed. pt continuing exercise regimen and healthy diet. no complaints or questions at this time. denies polyuria, polydipsia.

## 2022-10-19 NOTE — ASSESSMENT
[FreeTextEntry1] : 41M with PMHX DM presents for\par \par #DM\par - A1c 5.2%, from 5.5% \par - On Metformin 500 mg PO QD\par - Continues same dose regimen\par - Lifestyle modification discussed with patient \par - Check Albumin/Cr ratio\par - Last eye check on 3/2022\par - Pt counseled to exercise at least 5 times weekly for 30 minutes \par - Pt counseled to continue a diet low in carbs and high in vegetables and fruits \par - If experience dizziness , sweating, shaking; drink or eat something sweet and contact your doctor\par \par RTC 3 months for f/u\par \par Case discussed with Dr Perez \par

## 2022-10-19 NOTE — HISTORY OF PRESENT ILLNESS
[Diabetes Mellitus] : Diabetes Mellitus [General Appearance - Well Developed] : well developed [General Appearance - Well Nourished] : well nourished [Patient was last seen on ___] : Patient was last seen on [unfilled] [No episodes] : No hypoglycemic episodes since the last visit. [Normal Appearance] : normal appearance [Does not check] : Patient does not check blood glucose regularly [Well Groomed] : well groomed [General Appearance - In No Acute Distress] : no acute distress [Most Recent A1C: ___] : Most recent A1C was [unfilled] [Abdomen Soft] : soft [Target goal met] : A1C target goal met [Abdomen Tenderness] : non-tender [Moderate Intensity] : Patient is currently on moderate intensity statin  therapy [Costovertebral Angle Tenderness] : no ~M costovertebral angle tenderness [FreeTextEntry6] : 41M with PMHx DM presents for DM f/u. pt taking meds as prescribed. pt continuing exercise regimen and healthy diet. no complaints or questions at this time. denies polyuria, polydipsia.  [Urethral Meatus] : meatus normal [Penis Abnormality] : normal uncircumcised penis [Testes Tenderness] : no tenderness of the testes [Edema] : no peripheral edema [] : no respiratory distress [Respiration, Rhythm And Depth] : normal respiratory rhythm and effort [Exaggerated Use Of Accessory Muscles For Inspiration] : no accessory muscle use [Oriented To Time, Place, And Person] : oriented to person, place, and time [Affect] : the affect was normal [Mood] : the mood was normal [Not Anxious] : not anxious [Normal Station and Gait] : the gait and station were normal for the patient's age [No Focal Deficits] : no focal deficits [No Palpable Adenopathy] : no palpable adenopathy

## 2023-01-28 ENCOUNTER — OUTPATIENT (OUTPATIENT)
Dept: OUTPATIENT SERVICES | Facility: HOSPITAL | Age: 43
LOS: 1 days | End: 2023-01-28
Payer: SELF-PAY

## 2023-01-28 ENCOUNTER — APPOINTMENT (OUTPATIENT)
Dept: FAMILY MEDICINE | Facility: HOSPITAL | Age: 43
End: 2023-01-28

## 2023-01-28 VITALS
HEART RATE: 81 BPM | SYSTOLIC BLOOD PRESSURE: 106 MMHG | RESPIRATION RATE: 17 BRPM | TEMPERATURE: 98 F | DIASTOLIC BLOOD PRESSURE: 69 MMHG | OXYGEN SATURATION: 99 %

## 2023-01-28 DIAGNOSIS — Z29.9 ENCOUNTER FOR PROPHYLACTIC MEASURES, UNSPECIFIED: ICD-10-CM

## 2023-01-28 DIAGNOSIS — Z00.00 ENCOUNTER FOR GENERAL ADULT MEDICAL EXAMINATION WITHOUT ABNORMAL FINDINGS: ICD-10-CM

## 2023-01-28 NOTE — ASSESSMENT
[FreeTextEntry1] : 41M with PMHX DM presents for\par \par #DM\par - A1c 5.2% today\par - On Metformin 500 mg PO QD\par - Continues same dose regimen\par - Lifestyle modification discussed with patient \par - Last eye check on 3/2022\par - Pt counseled to exercise at least 5 times weekly for 30 minutes \par - Pt counseled to continue a diet low in carbs and high in vegetables and fruits \par - If experience dizziness , sweating, shaking; drink or eat something sweet and contact your doctor\par \par RTC 3 months for f/u\par \par Case discussed with Dr Zeng\par

## 2023-01-28 NOTE — HISTORY OF PRESENT ILLNESS
[Diabetes Mellitus] : Diabetes Mellitus [Patient was last seen on ___] : Patient was last seen on [unfilled] [No episodes] : No hypoglycemic episodes since the last visit. [Does not check] : Patient does not check blood glucose regularly [No Retinopathy] : No retinopathy [Most Recent A1C: ___] : Most recent A1C was [unfilled] [Target goal met] : A1C target goal met [Moderate Intensity] : Patient is currently on moderate intensity statin  therapy [FreeTextEntry6] : 42M with PMHx DM presents for DM f/u. pt taking meds as prescribed. pt continuing exercise regimen and healthy diet. no complaints or questions at this time. denies polyuria, polydipsia. \par  [EyeExamDate] : 03/2022

## 2023-01-28 NOTE — PHYSICAL EXAM
[Normal] : normal rate, regular rhythm, normal S1 and S2 and no murmur heard [No Edema] : there was no peripheral edema [No Joint Swelling] : no joint swelling [Grossly Normal Strength/Tone] : grossly normal strength/tone [Coordination Grossly Intact] : coordination grossly intact [No Focal Deficits] : no focal deficits [Normal Gait] : normal gait

## 2023-01-30 DIAGNOSIS — E11.9 TYPE 2 DIABETES MELLITUS WITHOUT COMPLICATIONS: ICD-10-CM

## 2023-01-30 DIAGNOSIS — Z29.9 ENCOUNTER FOR PROPHYLACTIC MEASURES, UNSPECIFIED: ICD-10-CM

## 2023-01-31 LAB
ALBUMIN SERPL ELPH-MCNC: 5 G/DL
ALP BLD-CCNC: 74 U/L
ALT SERPL-CCNC: 38 U/L
ANION GAP SERPL CALC-SCNC: 12 MMOL/L
AST SERPL-CCNC: 22 U/L
BILIRUB SERPL-MCNC: 0.4 MG/DL
BUN SERPL-MCNC: 21 MG/DL
CALCIUM SERPL-MCNC: 9.5 MG/DL
CHLORIDE SERPL-SCNC: 102 MMOL/L
CHOLEST SERPL-MCNC: 139 MG/DL
CO2 SERPL-SCNC: 23 MMOL/L
CREAT SERPL-MCNC: 0.67 MG/DL
EGFR: 120 ML/MIN/1.73M2
GLUCOSE SERPL-MCNC: 123 MG/DL
HDLC SERPL-MCNC: 48 MG/DL
LDLC SERPL CALC-MCNC: 66 MG/DL
NONHDLC SERPL-MCNC: 91 MG/DL
POTASSIUM SERPL-SCNC: 4.2 MMOL/L
PROT SERPL-MCNC: 8.2 G/DL
SODIUM SERPL-SCNC: 137 MMOL/L
TRIGL SERPL-MCNC: 124 MG/DL

## 2023-01-31 PROCEDURE — 80061 LIPID PANEL: CPT

## 2023-01-31 PROCEDURE — 85025 COMPLETE CBC W/AUTO DIFF WBC: CPT

## 2023-01-31 PROCEDURE — G0463: CPT

## 2023-01-31 PROCEDURE — 80053 COMPREHEN METABOLIC PANEL: CPT

## 2023-02-14 LAB
BASOPHILS # BLD AUTO: 0.03 K/UL
BASOPHILS NFR BLD AUTO: 0.4 %
EOSINOPHIL # BLD AUTO: 0.29 K/UL
EOSINOPHIL NFR BLD AUTO: 4.2 %
HCT VFR BLD CALC: 47.1 %
HGB BLD-MCNC: 15.9 G/DL
IMM GRANULOCYTES NFR BLD AUTO: 0.7 %
LYMPHOCYTES # BLD AUTO: 1.8 K/UL
LYMPHOCYTES NFR BLD AUTO: 25.8 %
MAN DIFF?: NORMAL
MCHC RBC-ENTMCNC: 30.2 PG
MCHC RBC-ENTMCNC: 33.8 GM/DL
MCV RBC AUTO: 89.5 FL
MONOCYTES # BLD AUTO: 0.5 K/UL
MONOCYTES NFR BLD AUTO: 7.2 %
NEUTROPHILS # BLD AUTO: 4.3 K/UL
NEUTROPHILS NFR BLD AUTO: 61.7 %
PLATELET # BLD AUTO: 176 K/UL
RBC # BLD: 5.26 M/UL
RBC # FLD: 12.6 %
WBC # FLD AUTO: 6.97 K/UL

## 2023-03-04 RX ORDER — ATORVASTATIN CALCIUM 20 MG/1
20 TABLET, FILM COATED ORAL
Qty: 90 | Refills: 1 | Status: ACTIVE | COMMUNITY
Start: 2020-03-18 | End: 1900-01-01

## 2023-07-15 ENCOUNTER — APPOINTMENT (OUTPATIENT)
Dept: FAMILY MEDICINE | Facility: HOSPITAL | Age: 43
End: 2023-07-15

## 2023-07-15 ENCOUNTER — OUTPATIENT (OUTPATIENT)
Dept: OUTPATIENT SERVICES | Facility: HOSPITAL | Age: 43
LOS: 1 days | End: 2023-07-15
Payer: SELF-PAY

## 2023-07-15 VITALS
RESPIRATION RATE: 16 BRPM | WEIGHT: 164 LBS | SYSTOLIC BLOOD PRESSURE: 108 MMHG | TEMPERATURE: 98.7 F | OXYGEN SATURATION: 99 % | BODY MASS INDEX: 29.05 KG/M2 | DIASTOLIC BLOOD PRESSURE: 68 MMHG | HEART RATE: 64 BPM

## 2023-07-15 DIAGNOSIS — Z00.00 ENCOUNTER FOR GENERAL ADULT MEDICAL EXAMINATION W/OUT ABNORMAL FINDINGS: ICD-10-CM

## 2023-07-15 DIAGNOSIS — E11.9 TYPE 2 DIABETES MELLITUS WITHOUT COMPLICATIONS: ICD-10-CM

## 2023-07-15 DIAGNOSIS — Z00.00 ENCOUNTER FOR GENERAL ADULT MEDICAL EXAMINATION WITHOUT ABNORMAL FINDINGS: ICD-10-CM

## 2023-07-15 DIAGNOSIS — E78.5 HYPERLIPIDEMIA, UNSPECIFIED: ICD-10-CM

## 2023-07-15 PROCEDURE — 36415 COLL VENOUS BLD VENIPUNCTURE: CPT

## 2023-07-15 PROCEDURE — 83036 HEMOGLOBIN GLYCOSYLATED A1C: CPT

## 2023-07-15 PROCEDURE — G0463: CPT

## 2023-07-16 PROBLEM — Z00.00 ENCOUNTER FOR PREVENTIVE HEALTH EXAMINATION: Status: ACTIVE | Noted: 2020-03-11

## 2023-07-16 PROBLEM — E78.5 HYPERLIPIDEMIA: Status: ACTIVE | Noted: 2020-03-18

## 2023-07-16 LAB
ESTIMATED AVERAGE GLUCOSE: 114 MG/DL
HBA1C MFR BLD HPLC: 5.6 %

## 2023-07-20 NOTE — HISTORY OF PRESENT ILLNESS
[FreeTextEntry1] : CPE  [de-identified] : 42 years old male with history of DM-2 presents to the clinic for CPE. Pt has no complaints and he is feeling well/ Pt is taking metformin and atorvastatin as prescribed. Pt denied  fever, chills, nausea, vomiting , chest pain, and sob

## 2023-10-03 NOTE — PLAN
[FreeTextEntry1] : 40 y/o M with PMHx of vitiligo and DMII came to the clinic for follow up of DM type II. \par \par #DM type II \par - A1c on 1/22/22- 5.5 % \par - A1c on March, 2021- 13% \par - On Metformin 500 mg PO daily\par - Continue on Metformin 500 mg PO daily \par - Appointment for fundus exam given\par - Microalbumin/cr ratio on Aug, 2021 normal \par - Foot exam WNL \par - Pt counseled to continue exercising at least 5 times weekly for 30 minutes \par - Pt counseled to continue a diet low in carbs and high in vegetables and fruits \par \par #Vitiligo \par - Pt with white patches in the fingertips of both hands that started 4 years ago\par - Pt also with a lesion on the forehead that started on march, 2020 \par - Pt reports a new lesion in the chest area and mild worsening of the previous ones \par - Dermatology referral given in previous visit but didn't schedule an appointment yet \par - Dermatology referral given again in previous visit. Pt needs to make appointment \par \par #Enlarge thyroid\par - TSH on Aug, 2021 - 5.54 \par - US thyroid showed borderline enlarge thyroid \par - Thyroid Ab, T3, T4 WNL \par - Recheck TSH \par  \par #Preventive measures\par - Vaccinations up to date \par \par RTC in 2 month for follow up of DM \par \par Case discussed with Dr. Perez \par 
Hypertension/Cardiac Disease/Other

## 2023-10-14 ENCOUNTER — OUTPATIENT (OUTPATIENT)
Dept: OUTPATIENT SERVICES | Facility: HOSPITAL | Age: 43
LOS: 1 days | End: 2023-10-14
Payer: SELF-PAY

## 2023-10-14 ENCOUNTER — RESULT CHARGE (OUTPATIENT)
Age: 43
End: 2023-10-14

## 2023-10-14 ENCOUNTER — APPOINTMENT (OUTPATIENT)
Dept: FAMILY MEDICINE | Facility: HOSPITAL | Age: 43
End: 2023-10-14

## 2023-10-14 VITALS
BODY MASS INDEX: 29.41 KG/M2 | HEIGHT: 63 IN | HEART RATE: 65 BPM | RESPIRATION RATE: 14 BRPM | SYSTOLIC BLOOD PRESSURE: 115 MMHG | TEMPERATURE: 98 F | OXYGEN SATURATION: 99 % | DIASTOLIC BLOOD PRESSURE: 78 MMHG | WEIGHT: 166 LBS

## 2023-10-14 DIAGNOSIS — E11.9 TYPE 2 DIABETES MELLITUS WITHOUT COMPLICATIONS: ICD-10-CM

## 2023-10-14 DIAGNOSIS — Z00.00 ENCOUNTER FOR GENERAL ADULT MEDICAL EXAMINATION WITHOUT ABNORMAL FINDINGS: ICD-10-CM

## 2023-10-14 PROCEDURE — G0463: CPT

## 2023-10-14 PROCEDURE — 82043 UR ALBUMIN QUANTITATIVE: CPT

## 2023-10-19 LAB
CREAT SPEC-SCNC: 108 MG/DL
HBA1C MFR BLD HPLC: 5.4
MICROALBUMIN 24H UR DL<=1MG/L-MCNC: <1.2 MG/DL
MICROALBUMIN/CREAT 24H UR-RTO: NORMAL MG/G

## 2023-11-08 ENCOUNTER — APPOINTMENT (OUTPATIENT)
Dept: OPHTHALMOLOGY | Facility: HOSPITAL | Age: 43
End: 2023-11-08

## 2023-11-08 ENCOUNTER — NON-APPOINTMENT (OUTPATIENT)
Age: 43
End: 2023-11-08

## 2023-11-08 ENCOUNTER — OUTPATIENT (OUTPATIENT)
Dept: OUTPATIENT SERVICES | Facility: HOSPITAL | Age: 43
LOS: 1 days | End: 2023-11-08
Payer: SELF-PAY

## 2023-11-08 VITALS
RESPIRATION RATE: 16 BRPM | WEIGHT: 165 LBS | DIASTOLIC BLOOD PRESSURE: 77 MMHG | SYSTOLIC BLOOD PRESSURE: 119 MMHG | HEART RATE: 72 BPM | BODY MASS INDEX: 29.23 KG/M2 | TEMPERATURE: 97.5 F | OXYGEN SATURATION: 99 %

## 2023-11-08 DIAGNOSIS — Z00.00 ENCOUNTER FOR GENERAL ADULT MEDICAL EXAMINATION WITHOUT ABNORMAL FINDINGS: ICD-10-CM

## 2023-11-08 DIAGNOSIS — Z01.00 ENCOUNTER FOR EXAMINATION OF EYES AND VISION WITHOUT ABNORMAL FINDINGS: ICD-10-CM

## 2023-11-08 DIAGNOSIS — Z01.00 ENCOUNTER FOR EXAMINATION OF EYES AND VISION W/OUT ABNORMAL FINDINGS: ICD-10-CM

## 2023-11-08 DIAGNOSIS — E11.9 TYPE 2 DIABETES MELLITUS WITHOUT COMPLICATIONS: ICD-10-CM

## 2023-11-08 DIAGNOSIS — E11.9 ENCOUNTER FOR EXAMINATION OF EYES AND VISION W/OUT ABNORMAL FINDINGS: ICD-10-CM

## 2023-11-08 PROCEDURE — G0463: CPT

## 2024-01-30 ENCOUNTER — MED ADMIN CHARGE (OUTPATIENT)
Age: 44
End: 2024-01-30

## 2024-01-30 ENCOUNTER — APPOINTMENT (OUTPATIENT)
Dept: FAMILY MEDICINE | Facility: HOSPITAL | Age: 44
End: 2024-01-30

## 2024-01-30 ENCOUNTER — OUTPATIENT (OUTPATIENT)
Dept: OUTPATIENT SERVICES | Facility: HOSPITAL | Age: 44
LOS: 1 days | End: 2024-01-30
Payer: SELF-PAY

## 2024-01-30 VITALS
OXYGEN SATURATION: 97 % | BODY MASS INDEX: 30.65 KG/M2 | TEMPERATURE: 97.3 F | DIASTOLIC BLOOD PRESSURE: 79 MMHG | WEIGHT: 173 LBS | RESPIRATION RATE: 16 BRPM | SYSTOLIC BLOOD PRESSURE: 116 MMHG | HEART RATE: 75 BPM

## 2024-01-30 DIAGNOSIS — Z13.5 ENCOUNTER FOR SCREENING FOR EYE AND EAR DISORDERS: ICD-10-CM

## 2024-01-30 DIAGNOSIS — Z23 ENCOUNTER FOR IMMUNIZATION: ICD-10-CM

## 2024-01-30 DIAGNOSIS — E11.9 TYPE 2 DIABETES MELLITUS WITHOUT COMPLICATIONS: ICD-10-CM

## 2024-01-30 DIAGNOSIS — Z00.00 ENCOUNTER FOR GENERAL ADULT MEDICAL EXAMINATION WITHOUT ABNORMAL FINDINGS: ICD-10-CM

## 2024-01-30 PROCEDURE — 80053 COMPREHEN METABOLIC PANEL: CPT

## 2024-01-30 PROCEDURE — G0463: CPT

## 2024-01-30 PROCEDURE — 80061 LIPID PANEL: CPT

## 2024-01-30 PROCEDURE — 83036 HEMOGLOBIN GLYCOSYLATED A1C: CPT

## 2024-01-30 PROCEDURE — 84443 ASSAY THYROID STIM HORMONE: CPT

## 2024-01-30 PROCEDURE — 85025 COMPLETE CBC W/AUTO DIFF WBC: CPT

## 2024-02-03 NOTE — HISTORY OF PRESENT ILLNESS
[Diabetes Mellitus] : Diabetes Mellitus [No episodes] : No hypoglycemic episodes since the last visit. [Most Recent A1C: ___] : Most recent A1C was [unfilled] [Moderate Intensity] : Patient is currently on moderate intensity statin  therapy [de-identified] : 42 y/o M who is here for f/u DM II  A1c has been lower than 6 Now on metformin 500mg QD and atorvastatin 20mg QD Last Albumin/Cr within 1 year, unremarkable  [FreeTextEntry6] : 44 y/o M who is here for f/u DM II  A1c has been lower than 6. Last in July 5.6. Was first diagnosed with DM II at  ED with A1c 13, glucose control has been improving since.  Now on metformin 500mg QD and atorvastatin 20mg QD Last Albumin/Cr within 1 year, unremarkable. Lipid panel 1 year ago, wnl. Comprehensive foot exam within 1 year, unremarkable.  Denies polydipsia/ polyuria/ unintentional weight loss.

## 2024-02-03 NOTE — PHYSICAL EXAM
[No Acute Distress] : no acute distress [Well Developed] : well developed [Normal Sclera/Conjunctiva] : normal sclera/conjunctiva [EOMI] : extraocular movements intact [No Respiratory Distress] : no respiratory distress  [No Accessory Muscle Use] : no accessory muscle use [Clear to Auscultation] : lungs were clear to auscultation bilaterally [Normal Rate] : normal rate  [Regular Rhythm] : with a regular rhythm [Normal S1, S2] : normal S1 and S2 [No Murmur] : no murmur heard [No Edema] : there was no peripheral edema [Soft] : abdomen soft [Non Tender] : non-tender [Non-distended] : non-distended [Normal Bowel Sounds] : normal bowel sounds [de-identified] : no ulcers/ wounds appreciated in foot

## 2024-02-03 NOTE — INTERPRETER SERVICES
[Other: ______] : provided by [employer] [Interpreters_IDNumber] : Teresa  [Interpreters_FullName] : [id number] [TWNoteComboBox1] : [place]

## 2024-02-03 NOTE — PHYSICAL EXAM
[No Acute Distress] : no acute distress [Well Developed] : well developed [Normal Sclera/Conjunctiva] : normal sclera/conjunctiva [EOMI] : extraocular movements intact [No Respiratory Distress] : no respiratory distress  [No Accessory Muscle Use] : no accessory muscle use [Clear to Auscultation] : lungs were clear to auscultation bilaterally [Normal Rate] : normal rate  [Regular Rhythm] : with a regular rhythm [Normal S1, S2] : normal S1 and S2 [No Murmur] : no murmur heard [No Edema] : there was no peripheral edema [Soft] : abdomen soft [Non Tender] : non-tender [Non-distended] : non-distended [Normal Bowel Sounds] : normal bowel sounds [de-identified] : no ulcers/ wounds appreciated in foot

## 2024-02-03 NOTE — HISTORY OF PRESENT ILLNESS
[Diabetes Mellitus] : Diabetes Mellitus [No episodes] : No hypoglycemic episodes since the last visit. [Most Recent A1C: ___] : Most recent A1C was [unfilled] [Moderate Intensity] : Patient is currently on moderate intensity statin  therapy [de-identified] : 42 y/o M who is here for f/u DM II  A1c has been lower than 6 Now on metformin 500mg QD and atorvastatin 20mg QD Last Albumin/Cr within 1 year, unremarkable  [FreeTextEntry6] : 44 y/o M who is here for f/u DM II  A1c has been lower than 6. Last in July 5.6. Was first diagnosed with DM II at  ED with A1c 13, glucose control has been improving since.  Now on metformin 500mg QD and atorvastatin 20mg QD Last Albumin/Cr within 1 year, unremarkable. Lipid panel 1 year ago, wnl. Comprehensive foot exam within 1 year, unremarkable.  Denies polydipsia/ polyuria/ unintentional weight loss.

## 2024-02-03 NOTE — INTERPRETER SERVICES
[Other: ______] : provided by [employer] [Interpreters_IDNumber] : Teresa  [Interpreters_FullName] : [id number] [TWNoteComboBox1] : [place]ian

## 2024-02-06 ENCOUNTER — OUTPATIENT (OUTPATIENT)
Dept: OUTPATIENT SERVICES | Facility: HOSPITAL | Age: 44
LOS: 1 days | End: 2024-02-06

## 2024-02-06 DIAGNOSIS — E11.9 TYPE 2 DIABETES MELLITUS WITHOUT COMPLICATIONS: ICD-10-CM

## 2024-02-11 LAB
ALBUMIN SERPL ELPH-MCNC: 4.7 G/DL
ALP BLD-CCNC: 67 U/L
ALT SERPL-CCNC: 22 U/L
ANION GAP SERPL CALC-SCNC: 12 MMOL/L
AST SERPL-CCNC: 16 U/L
BASOPHILS # BLD AUTO: 0.03 K/UL
BASOPHILS NFR BLD AUTO: 0.4 %
BILIRUB SERPL-MCNC: 0.4 MG/DL
BUN SERPL-MCNC: 18 MG/DL
CALCIUM SERPL-MCNC: 9.1 MG/DL
CHLORIDE SERPL-SCNC: 103 MMOL/L
CO2 SERPL-SCNC: 24 MMOL/L
CREAT SERPL-MCNC: 0.75 MG/DL
EGFR: 116 ML/MIN/1.73M2
EOSINOPHIL # BLD AUTO: 0.33 K/UL
EOSINOPHIL NFR BLD AUTO: 4.3 %
ESTIMATED AVERAGE GLUCOSE: 114 MG/DL
GLUCOSE SERPL-MCNC: 123 MG/DL
HBA1C MFR BLD HPLC: 5.6 %
HCT VFR BLD CALC: 48 %
HGB BLD-MCNC: 16.3 G/DL
IMM GRANULOCYTES NFR BLD AUTO: 0.9 %
LYMPHOCYTES # BLD AUTO: 1.96 K/UL
LYMPHOCYTES NFR BLD AUTO: 25.7 %
MAN DIFF?: NORMAL
MCHC RBC-ENTMCNC: 30.5 PG
MCHC RBC-ENTMCNC: 34 GM/DL
MCV RBC AUTO: 89.9 FL
MONOCYTES # BLD AUTO: 0.58 K/UL
MONOCYTES NFR BLD AUTO: 7.6 %
NEUTROPHILS # BLD AUTO: 4.67 K/UL
NEUTROPHILS NFR BLD AUTO: 61.1 %
PLATELET # BLD AUTO: 197 K/UL
POTASSIUM SERPL-SCNC: 4.2 MMOL/L
PROT SERPL-MCNC: 8.1 G/DL
RBC # BLD: 5.34 M/UL
RBC # FLD: 12.8 %
SODIUM SERPL-SCNC: 139 MMOL/L
TSH SERPL-ACNC: 3.83 UIU/ML
WBC # FLD AUTO: 7.64 K/UL

## 2024-02-17 LAB
CHOLEST SERPL-MCNC: 183 MG/DL
HDLC SERPL-MCNC: 41 MG/DL
LDLC SERPL CALC-MCNC: 108 MG/DL
NONHDLC SERPL-MCNC: 142 MG/DL
TRIGL SERPL-MCNC: 190 MG/DL

## 2024-04-11 ENCOUNTER — APPOINTMENT (OUTPATIENT)
Dept: OPHTHALMOLOGY | Facility: CLINIC | Age: 44
End: 2024-04-11
Payer: SELF-PAY

## 2024-04-11 ENCOUNTER — NON-APPOINTMENT (OUTPATIENT)
Age: 44
End: 2024-04-11

## 2024-04-11 PROCEDURE — 92004 COMPRE OPH EXAM NEW PT 1/>: CPT

## 2024-05-18 ENCOUNTER — OUTPATIENT (OUTPATIENT)
Dept: OUTPATIENT SERVICES | Facility: HOSPITAL | Age: 44
LOS: 1 days | End: 2024-05-18
Payer: SELF-PAY

## 2024-05-18 ENCOUNTER — APPOINTMENT (OUTPATIENT)
Dept: FAMILY MEDICINE | Facility: HOSPITAL | Age: 44
End: 2024-05-18

## 2024-05-18 VITALS
RESPIRATION RATE: 17 BRPM | HEART RATE: 68 BPM | DIASTOLIC BLOOD PRESSURE: 75 MMHG | OXYGEN SATURATION: 99 % | TEMPERATURE: 98.3 F | SYSTOLIC BLOOD PRESSURE: 124 MMHG | BODY MASS INDEX: 29.23 KG/M2 | WEIGHT: 165 LBS

## 2024-05-18 DIAGNOSIS — E11.9 TYPE 2 DIABETES MELLITUS W/OUT COMPLICATIONS: ICD-10-CM

## 2024-05-18 DIAGNOSIS — E11.9 TYPE 2 DIABETES MELLITUS WITHOUT COMPLICATIONS: ICD-10-CM

## 2024-05-18 DIAGNOSIS — Z00.00 ENCOUNTER FOR GENERAL ADULT MEDICAL EXAMINATION WITHOUT ABNORMAL FINDINGS: ICD-10-CM

## 2024-05-18 PROCEDURE — G0463: CPT

## 2024-05-18 RX ORDER — METFORMIN HYDROCHLORIDE 500 MG/1
500 TABLET, COATED ORAL DAILY
Qty: 90 | Refills: 3 | Status: ACTIVE | COMMUNITY
Start: 2020-03-11 | End: 1900-01-01

## 2024-05-20 LAB — HBA1C MFR BLD HPLC: 5.5

## 2024-05-24 PROBLEM — E11.9 DIABETES MELLITUS: Status: ACTIVE | Noted: 2020-03-11

## 2024-05-29 NOTE — HISTORY OF PRESENT ILLNESS
[FreeTextEntry1] : DM-2 follow-up  [de-identified] : 42 years old male with history of DM-2 presents to the clinic for DM-2  Pt has no complaints and he is feeling well/ Pt is taking metformin and as prescribed. Pt denied  fever, chills, nausea, vomiting , chest pain, and sob

## 2024-09-21 ENCOUNTER — APPOINTMENT (OUTPATIENT)
Dept: FAMILY MEDICINE | Facility: HOSPITAL | Age: 44
End: 2024-09-21

## 2024-09-21 ENCOUNTER — MED ADMIN CHARGE (OUTPATIENT)
Age: 44
End: 2024-09-21

## 2024-09-21 ENCOUNTER — RESULT CHARGE (OUTPATIENT)
Age: 44
End: 2024-09-21

## 2024-09-21 ENCOUNTER — OUTPATIENT (OUTPATIENT)
Dept: OUTPATIENT SERVICES | Facility: HOSPITAL | Age: 44
LOS: 1 days | End: 2024-09-21
Payer: SELF-PAY

## 2024-09-21 VITALS
TEMPERATURE: 97.3 F | WEIGHT: 170 LBS | RESPIRATION RATE: 17 BRPM | BODY MASS INDEX: 30.12 KG/M2 | HEART RATE: 78 BPM | DIASTOLIC BLOOD PRESSURE: 68 MMHG | SYSTOLIC BLOOD PRESSURE: 111 MMHG | HEIGHT: 63 IN | OXYGEN SATURATION: 98 %

## 2024-09-21 DIAGNOSIS — Z00.00 ENCOUNTER FOR GENERAL ADULT MEDICAL EXAMINATION WITHOUT ABNORMAL FINDINGS: ICD-10-CM

## 2024-09-21 DIAGNOSIS — E11.9 TYPE 2 DIABETES MELLITUS W/OUT COMPLICATIONS: ICD-10-CM

## 2024-09-21 DIAGNOSIS — E11.9 TYPE 2 DIABETES MELLITUS WITHOUT COMPLICATIONS: ICD-10-CM

## 2024-09-21 PROCEDURE — G0463: CPT

## 2024-09-22 LAB — HBA1C MFR BLD HPLC: 5.5

## 2024-09-22 NOTE — PHYSICAL EXAM
[No Acute Distress] : no acute distress [Well-Appearing] : well-appearing [EOMI] : extraocular movements intact [No Edema] : there was no peripheral edema [Normal Gait] : normal gait [Normal] : affect was normal and insight and judgment were intact

## 2024-09-22 NOTE — HISTORY OF PRESENT ILLNESS
[FreeTextEntry1] : f/u DM2 [de-identified] : 43M presents for DM2 follow up. last seen 5/18/2024. last a1c 5.5. pt is taking metformin 500mg qd. no polyuria, polydipsia, sensory changes, hypoglycemic episodes.

## 2024-09-22 NOTE — HISTORY OF PRESENT ILLNESS
[FreeTextEntry1] : f/u DM2 [de-identified] : 43M presents for DM2 follow up. last seen 5/18/2024. last a1c 5.5. pt is taking metformin 500mg qd. no polyuria, polydipsia, sensory changes, hypoglycemic episodes.

## 2024-09-22 NOTE — PHYSICAL EXAM
Dr. Ventura [No Acute Distress] : no acute distress toro [Well-Appearing] : well-appearing [EOMI] : extraocular movements intact [No Edema] : there was no peripheral edema [Normal Gait] : normal gait [Normal] : affect was normal and insight and judgment were intact

## 2024-12-21 ENCOUNTER — OUTPATIENT (OUTPATIENT)
Dept: OUTPATIENT SERVICES | Facility: HOSPITAL | Age: 44
LOS: 1 days | End: 2024-12-21
Payer: SELF-PAY

## 2024-12-21 ENCOUNTER — RESULT CHARGE (OUTPATIENT)
Age: 44
End: 2024-12-21

## 2024-12-21 ENCOUNTER — APPOINTMENT (OUTPATIENT)
Dept: FAMILY MEDICINE | Facility: HOSPITAL | Age: 44
End: 2024-12-21

## 2024-12-21 VITALS
RESPIRATION RATE: 17 BRPM | HEART RATE: 78 BPM | OXYGEN SATURATION: 99 % | BODY MASS INDEX: 30.11 KG/M2 | TEMPERATURE: 97.9 F | WEIGHT: 170 LBS

## 2024-12-21 DIAGNOSIS — Z00.00 ENCOUNTER FOR GENERAL ADULT MEDICAL EXAMINATION WITHOUT ABNORMAL FINDINGS: ICD-10-CM

## 2024-12-21 DIAGNOSIS — E11.9 TYPE 2 DIABETES MELLITUS WITHOUT COMPLICATIONS: ICD-10-CM

## 2024-12-21 DIAGNOSIS — Z00.00 ENCOUNTER FOR GENERAL ADULT MEDICAL EXAMINATION W/OUT ABNORMAL FINDINGS: ICD-10-CM

## 2024-12-21 DIAGNOSIS — E11.9 TYPE 2 DIABETES MELLITUS W/OUT COMPLICATIONS: ICD-10-CM

## 2024-12-21 PROCEDURE — 82043 UR ALBUMIN QUANTITATIVE: CPT

## 2024-12-21 PROCEDURE — 80061 LIPID PANEL: CPT

## 2024-12-21 PROCEDURE — 80053 COMPREHEN METABOLIC PANEL: CPT

## 2024-12-21 PROCEDURE — G0463: CPT

## 2024-12-27 LAB
ALBUMIN SERPL ELPH-MCNC: 4.8 G/DL
ALP BLD-CCNC: 68 U/L
ALT SERPL-CCNC: 26 U/L
ANION GAP SERPL CALC-SCNC: 10 MMOL/L
AST SERPL-CCNC: 19 U/L
BILIRUB SERPL-MCNC: 0.4 MG/DL
BUN SERPL-MCNC: 19 MG/DL
CALCIUM SERPL-MCNC: 9.6 MG/DL
CHLORIDE SERPL-SCNC: 103 MMOL/L
CHOLEST SERPL-MCNC: 207 MG/DL
CO2 SERPL-SCNC: 24 MMOL/L
CREAT SERPL-MCNC: 0.68 MG/DL
CREAT SPEC-SCNC: 62 MG/DL
EGFR: 118 ML/MIN/1.73M2
GLUCOSE SERPL-MCNC: 117 MG/DL
HBA1C MFR BLD HPLC: NORMAL
HDLC SERPL-MCNC: 48 MG/DL
LDLC SERPL CALC-MCNC: 133 MG/DL
MICROALBUMIN 24H UR DL<=1MG/L-MCNC: <1.2 MG/DL
MICROALBUMIN/CREAT 24H UR-RTO: NORMAL MG/G
NONHDLC SERPL-MCNC: 159 MG/DL
POTASSIUM SERPL-SCNC: 4.2 MMOL/L
PROT SERPL-MCNC: 8 G/DL
SODIUM SERPL-SCNC: 138 MMOL/L
TRIGL SERPL-MCNC: 145 MG/DL

## 2025-03-15 ENCOUNTER — APPOINTMENT (OUTPATIENT)
Age: 45
End: 2025-03-15

## 2025-03-15 ENCOUNTER — OUTPATIENT (OUTPATIENT)
Dept: OUTPATIENT SERVICES | Facility: HOSPITAL | Age: 45
LOS: 1 days | End: 2025-03-15
Payer: SELF-PAY

## 2025-03-15 VITALS
RESPIRATION RATE: 18 BRPM | DIASTOLIC BLOOD PRESSURE: 71 MMHG | BODY MASS INDEX: 29.76 KG/M2 | SYSTOLIC BLOOD PRESSURE: 112 MMHG | WEIGHT: 168 LBS | TEMPERATURE: 98 F | HEART RATE: 87 BPM | OXYGEN SATURATION: 96 %

## 2025-03-15 DIAGNOSIS — E11.9 TYPE 2 DIABETES MELLITUS WITHOUT COMPLICATIONS: ICD-10-CM

## 2025-03-15 DIAGNOSIS — Z00.00 ENCOUNTER FOR GENERAL ADULT MEDICAL EXAMINATION WITHOUT ABNORMAL FINDINGS: ICD-10-CM

## 2025-03-15 DIAGNOSIS — Z29.9 ENCOUNTER FOR PROPHYLACTIC MEASURES, UNSPECIFIED: ICD-10-CM

## 2025-03-15 DIAGNOSIS — Z92.29 PERSONAL HISTORY OF OTHER DRUG THERAPY: ICD-10-CM

## 2025-03-15 DIAGNOSIS — Z23 ENCOUNTER FOR IMMUNIZATION: ICD-10-CM

## 2025-03-15 DIAGNOSIS — E11.9 TYPE 2 DIABETES MELLITUS W/OUT COMPLICATIONS: ICD-10-CM

## 2025-03-15 LAB — HBA1C MFR BLD HPLC: NORMAL

## 2025-03-15 PROCEDURE — 83036 HEMOGLOBIN GLYCOSYLATED A1C: CPT

## 2025-03-15 PROCEDURE — G0463: CPT
